# Patient Record
Sex: MALE | Race: WHITE | Employment: FULL TIME
[De-identification: names, ages, dates, MRNs, and addresses within clinical notes are randomized per-mention and may not be internally consistent; named-entity substitution may affect disease eponyms.]

---

## 2017-01-05 ENCOUNTER — TELEPHONE (OUTPATIENT)
Dept: FAMILY MEDICINE CLINIC | Facility: CLINIC | Age: 52
End: 2017-01-05

## 2017-01-05 DIAGNOSIS — M25.562 ACUTE PAIN OF LEFT KNEE: Primary | ICD-10-CM

## 2017-09-26 ENCOUNTER — OFFICE VISIT (OUTPATIENT)
Dept: FAMILY MEDICINE CLINIC | Age: 52
End: 2017-09-26
Payer: COMMERCIAL

## 2017-09-26 VITALS
DIASTOLIC BLOOD PRESSURE: 80 MMHG | TEMPERATURE: 97.2 F | OXYGEN SATURATION: 96 % | SYSTOLIC BLOOD PRESSURE: 116 MMHG | BODY MASS INDEX: 36.32 KG/M2 | WEIGHT: 283 LBS | HEART RATE: 71 BPM | HEIGHT: 74 IN

## 2017-09-26 DIAGNOSIS — Z12.5 SCREENING FOR PROSTATE CANCER: ICD-10-CM

## 2017-09-26 DIAGNOSIS — Z12.11 SCREENING FOR COLORECTAL CANCER: ICD-10-CM

## 2017-09-26 DIAGNOSIS — Z13.220 SCREENING, LIPID: ICD-10-CM

## 2017-09-26 DIAGNOSIS — Z12.12 SCREENING FOR COLORECTAL CANCER: ICD-10-CM

## 2017-09-26 DIAGNOSIS — M17.12 PRIMARY OSTEOARTHRITIS OF LEFT KNEE: Primary | ICD-10-CM

## 2017-09-26 PROCEDURE — 99213 OFFICE O/P EST LOW 20 MIN: CPT | Performed by: PHYSICIAN ASSISTANT

## 2017-09-26 RX ORDER — MELOXICAM 15 MG/1
15 TABLET ORAL DAILY
Qty: 30 TABLET | Refills: 3 | Status: SHIPPED | OUTPATIENT
Start: 2017-09-26 | End: 2018-03-23 | Stop reason: ALTCHOICE

## 2017-09-26 ASSESSMENT — ENCOUNTER SYMPTOMS
COUGH: 0
COLOR CHANGE: 0
SHORTNESS OF BREATH: 0
BACK PAIN: 0

## 2018-01-05 RX ORDER — LORATADINE 10 MG/1
10 CAPSULE, LIQUID FILLED ORAL DAILY
Qty: 30 CAPSULE | Refills: 6 | Status: SHIPPED | OUTPATIENT
Start: 2018-01-05 | End: 2018-03-23 | Stop reason: SDUPTHER

## 2018-01-05 RX ORDER — OMEPRAZOLE 20 MG/1
20 CAPSULE, DELAYED RELEASE ORAL DAILY
Qty: 90 CAPSULE | Refills: 3 | Status: SHIPPED | OUTPATIENT
Start: 2018-01-05 | End: 2018-03-23 | Stop reason: ALTCHOICE

## 2018-01-05 NOTE — TELEPHONE ENCOUNTER
Next Visit Date:  No future appointments.     Health Maintenance   Topic Date Due    HIV screen  07/05/1982    DTaP/Tdap/Td vaccine (1 - Tdap) 07/05/1986    Lipid screen  07/05/2007    Diabetes screen  07/05/2007    Colon cancer screen colonoscopy  07/05/2017    Flu vaccine  Completed       No results found for: LABA1C          ( goal A1C is < 7)   No results found for: LABMICR  No results found for: LDLCHOLESTEROL, LDLCALC    (goal LDL is <100)   No results found for: AST, ALT, BUN  BP Readings from Last 3 Encounters:   09/26/17 116/80   11/21/16 122/80   05/28/16 (!) 138/91          (goal 120/80)    All Future Testing planned in [facility]  Lab Frequency Next Occurrence   Comprehensive Metabolic Panel Once 97/05/5463   Lipid Panel Once 08/03/2018   CBC Auto Differential Once 08/03/2018   Psa screening Once 08/03/2018               Patient Active Problem List:     GERD (gastroesophageal reflux disease)     Allergic rhinitis

## 2018-03-23 ENCOUNTER — OFFICE VISIT (OUTPATIENT)
Dept: FAMILY MEDICINE CLINIC | Age: 53
End: 2018-03-23
Payer: COMMERCIAL

## 2018-03-23 VITALS
OXYGEN SATURATION: 98 % | BODY MASS INDEX: 35.78 KG/M2 | WEIGHT: 293.8 LBS | TEMPERATURE: 97 F | DIASTOLIC BLOOD PRESSURE: 68 MMHG | HEART RATE: 95 BPM | HEIGHT: 76 IN | SYSTOLIC BLOOD PRESSURE: 112 MMHG

## 2018-03-23 DIAGNOSIS — Z82.49 FAMILY HISTORY OF HEART DISEASE: ICD-10-CM

## 2018-03-23 DIAGNOSIS — I21.4 NSTEMI (NON-ST ELEVATED MYOCARDIAL INFARCTION) (HCC): Primary | ICD-10-CM

## 2018-03-23 PROCEDURE — 1111F DSCHRG MED/CURRENT MED MERGE: CPT | Performed by: PHYSICIAN ASSISTANT

## 2018-03-23 PROCEDURE — 99213 OFFICE O/P EST LOW 20 MIN: CPT | Performed by: PHYSICIAN ASSISTANT

## 2018-03-23 RX ORDER — PANTOPRAZOLE SODIUM 40 MG/1
40 TABLET, DELAYED RELEASE ORAL DAILY
Qty: 30 TABLET | Refills: 5 | Status: SHIPPED | OUTPATIENT
Start: 2018-03-23 | End: 2019-08-26 | Stop reason: SDUPTHER

## 2018-03-23 RX ORDER — CLOPIDOGREL BISULFATE 75 MG/1
75 TABLET ORAL DAILY
COMMUNITY
Start: 2018-03-16 | End: 2018-03-23 | Stop reason: SDUPTHER

## 2018-03-23 RX ORDER — ASPIRIN 81 MG/1
81 TABLET, DELAYED RELEASE ORAL DAILY
Refills: 3 | COMMUNITY
Start: 2018-03-15 | End: 2019-04-29 | Stop reason: SDUPTHER

## 2018-03-23 RX ORDER — METOPROLOL SUCCINATE 100 MG/1
100 TABLET, EXTENDED RELEASE ORAL DAILY
Qty: 30 TABLET | Refills: 5 | Status: SHIPPED | OUTPATIENT
Start: 2018-03-23 | End: 2019-08-26

## 2018-03-23 RX ORDER — PANTOPRAZOLE SODIUM 40 MG/1
40 TABLET, DELAYED RELEASE ORAL DAILY
COMMUNITY
End: 2018-03-23 | Stop reason: SDUPTHER

## 2018-03-23 RX ORDER — ATORVASTATIN CALCIUM 80 MG/1
80 TABLET, FILM COATED ORAL DAILY
COMMUNITY
Start: 2018-03-15 | End: 2018-03-23 | Stop reason: SDUPTHER

## 2018-03-23 RX ORDER — NITROGLYCERIN 0.4 MG/1
0.4 TABLET SUBLINGUAL EVERY 5 MIN PRN
Qty: 25 TABLET | Refills: 0 | Status: SHIPPED | OUTPATIENT
Start: 2018-03-23

## 2018-03-23 RX ORDER — LORATADINE 10 MG/1
10 CAPSULE, LIQUID FILLED ORAL DAILY
Qty: 30 CAPSULE | Refills: 6 | Status: SHIPPED | OUTPATIENT
Start: 2018-03-23 | End: 2019-08-26 | Stop reason: SDUPTHER

## 2018-03-23 RX ORDER — ATORVASTATIN CALCIUM 80 MG/1
80 TABLET, FILM COATED ORAL DAILY
Qty: 30 TABLET | Refills: 5 | Status: SHIPPED | OUTPATIENT
Start: 2018-03-23 | End: 2019-08-26 | Stop reason: SDUPTHER

## 2018-03-23 RX ORDER — CLOPIDOGREL BISULFATE 75 MG/1
75 TABLET ORAL DAILY
Qty: 30 TABLET | Refills: 5 | Status: SHIPPED | OUTPATIENT
Start: 2018-03-23 | End: 2019-08-26 | Stop reason: SDUPTHER

## 2018-03-23 RX ORDER — NITROGLYCERIN 0.4 MG/1
0.4 TABLET SUBLINGUAL EVERY 5 MIN PRN
COMMUNITY
End: 2018-03-23 | Stop reason: SDUPTHER

## 2018-03-23 RX ORDER — METOPROLOL SUCCINATE 100 MG/1
100 TABLET, EXTENDED RELEASE ORAL DAILY
COMMUNITY
Start: 2018-03-15 | End: 2018-03-23 | Stop reason: SDUPTHER

## 2018-03-23 ASSESSMENT — ENCOUNTER SYMPTOMS
CONSTIPATION: 0
SHORTNESS OF BREATH: 0
COLOR CHANGE: 0
COUGH: 0
WHEEZING: 0
ABDOMINAL PAIN: 0
DIARRHEA: 0
NAUSEA: 0
VOMITING: 0

## 2018-03-23 ASSESSMENT — PATIENT HEALTH QUESTIONNAIRE - PHQ9
1. LITTLE INTEREST OR PLEASURE IN DOING THINGS: 0
SUM OF ALL RESPONSES TO PHQ QUESTIONS 1-9: 0
2. FEELING DOWN, DEPRESSED OR HOPELESS: 0
SUM OF ALL RESPONSES TO PHQ9 QUESTIONS 1 & 2: 0

## 2018-03-23 NOTE — LETTER
[address]  [facility] [address]  Phone: [phone]  Fax: [phone]    Ceasardelfino Prosper        March 23, 2018     Patient: Tiffanie Carpio   YOB: 1965   Date of Visit: 3/23/2018       To Whom It May Concern:    Mr Tiffanie Carpio was hospitalized from 3/12/18. Due to his condition, he should not work until June 1st 2018 unless otherwise stated by his outpatient cardiologist. Please release him from work during this period. It would be in the best interest of this patient and his health        Sincerely,        Bibi Polk PA-C

## 2018-03-23 NOTE — PROGRESS NOTES
[address] [facility] [address]  Dept: [phone]  Dept Fax: [phone]    Bowen Novoa is a 50 y.o. male who presents today for his medical conditions/complaints as noted below. Bowen Novoa is c/o of   Chief Complaint   Patient presents with    Other     Patient is here for follow up after having chest pain at work. Patient was in [facility].         HPI:     HPI    Patient states he was at work and started having chest pain. He went to [facility] and had a work up there. Initial work up was neg but while he was there elevation in the enzymes were noted. He had angiogram done and during the testing he had cardiac arrest.   He ended up having 3 stents placed through right arm cath. He has follow up with Dr. Martinez Goncalves at [facility] for cardiology next week. He is also scheduled for cardiac rehab  Off work through June 4th per cardiology. They need another note stating the same due to insurance request    He is feeling well at this point. Here today with his wife. He states eating and drinking well.     No results found for: LABA1C          ( goal A1C is < 7)   No results found for: LABMICR  No results found for: LDLCHOLESTEROL, LDLCALC    (goal LDL is <100)   No results found for: AST, ALT, BUN  BP Readings from Last 3 Encounters:   03/23/18 112/68   09/26/17 116/80   11/21/16 122/80          (goal 120/80)    Past Medical History:   Diagnosis Date    Allergic rhinitis     GERD (gastroesophageal reflux disease)     Heart attack 03/12/2018    Patient had 3 stents placed    NSTEMI (non-ST elevated myocardial infarction) ([address] 3/23/2018      Past Surgical History:   Procedure Laterality Date    CORONARY ANGIOPLASTY WITH STENT PLACEMENT  03/12/2018    3 stents xience alpine 4.0 x 18 mm LAD    KNEE SURGERY Left     at 26 yo       Family History   Problem Relation Age of Onset    Diabetes Mother     Diabetes pain, constipation, diarrhea, nausea and vomiting. Genitourinary: Negative for dysuria. Skin: Negative for color change, pallor, rash and wound. Neurological: Negative for weakness. Hematological: Negative for adenopathy. Psychiatric/Behavioral: The patient is not nervous/anxious. Objective:     Physical Exam   Constitutional: He is oriented to person, place, and time. He appears well-developed and well-nourished. HENT:   Head: Normocephalic and atraumatic. Cardiovascular: Normal rate, regular rhythm and normal heart sounds. No murmur heard. Pulmonary/Chest: Effort normal and breath sounds normal. No respiratory distress. He has no wheezes. He has no rales. Musculoskeletal: He exhibits no edema. Neurological: He is alert and oriented to person, place, and time. Skin: Skin is warm and dry. No rash noted. No erythema. No pallor. Psychiatric: He has a normal mood and affect. His behavior is normal. Judgment and thought content normal.   Nursing note and vitals reviewed. /68   Pulse 95   Temp 97 °F (36.1 °C) (Oral)   Ht 6' 4\" (1.93 m)   Wt 293 lb 12.8 oz (133.3 kg)   SpO2 98%   BMI 35.76 kg/m²     Assessment:      1. NSTEMI (non-ST elevated myocardial infarction) ([address]     2. Family history of heart disease               Plan:      Return in about 3 months (around 6/23/2018) for med review. Medications reviewed, continue with no changes  Cont with cardiology follow up upcoming  We did provide another note for patient with same dates as cardiology note, copy to system  Encouraged healthy LF diet, exercise as recommended per cardiology  Avoid salt in diet  Will see patient back in 3 mo sooner prn  Patient agreeed with treatment plan  Health Maintenance reviewed.       Orders Placed This Encounter   Medications    loratadine (CLARITIN) 10 MG capsule     Sig: Take 1 capsule by mouth daily     Dispense:  30 capsule     Refill:  6    pantoprazole (PROTONIX) 40 MG tablet

## 2018-03-23 NOTE — LETTER
[address]  [place] [address]  Phone: [phone]  Fax: [phone]    Ashlydaryl Shirley        March 23, 2018     Patient: Medardo Rubin   YOB: 1965   Date of Visit: 3/23/2018       To Whom It May Concern:    Mr Medardo Rubin was hospitalized from 3/12/18 under my care for a serious heart condition. Due to his condition, he should not work until June 1st 2018 unless otherwise stated by his outpatient cardiologist. Please, release him from work during this period. It would be in the best interest of this patient and his health.         Sincerely,        Maurice Morillo PA-C

## 2018-04-02 ENCOUNTER — OFFICE VISIT (OUTPATIENT)
Dept: FAMILY MEDICINE CLINIC | Age: 53
End: 2018-04-02
Payer: COMMERCIAL

## 2018-04-02 VITALS
HEIGHT: 76 IN | WEIGHT: 288 LBS | OXYGEN SATURATION: 97 % | DIASTOLIC BLOOD PRESSURE: 60 MMHG | HEART RATE: 81 BPM | TEMPERATURE: 97 F | SYSTOLIC BLOOD PRESSURE: 116 MMHG | RESPIRATION RATE: 18 BRPM | BODY MASS INDEX: 35.07 KG/M2

## 2018-04-02 DIAGNOSIS — K59.09 OTHER CONSTIPATION: ICD-10-CM

## 2018-04-02 DIAGNOSIS — K64.4 HEMORRHOIDS, EXTERNAL: Primary | ICD-10-CM

## 2018-04-02 DIAGNOSIS — K60.2 FISSURE IN ANO: ICD-10-CM

## 2018-04-02 PROCEDURE — 99213 OFFICE O/P EST LOW 20 MIN: CPT | Performed by: INTERNAL MEDICINE

## 2018-04-02 ASSESSMENT — ENCOUNTER SYMPTOMS
CHEST TIGHTNESS: 0
ABDOMINAL PAIN: 0
BACK PAIN: 0
RECTAL PAIN: 1
DIARRHEA: 0
ABDOMINAL DISTENTION: 0
STRIDOR: 0
FLATUS: 1
CHOKING: 0
BLOOD IN STOOL: 0
VOMITING: 0
SHORTNESS OF BREATH: 0
WHEEZING: 0
COUGH: 0
CONSTIPATION: 1
ANAL BLEEDING: 1
NAUSEA: 0

## 2018-05-24 ENCOUNTER — OFFICE VISIT (OUTPATIENT)
Dept: FAMILY MEDICINE CLINIC | Age: 53
End: 2018-05-24
Payer: COMMERCIAL

## 2018-05-24 VITALS
OXYGEN SATURATION: 99 % | SYSTOLIC BLOOD PRESSURE: 112 MMHG | HEIGHT: 76 IN | HEART RATE: 83 BPM | WEIGHT: 285 LBS | DIASTOLIC BLOOD PRESSURE: 68 MMHG | BODY MASS INDEX: 34.7 KG/M2

## 2018-05-24 DIAGNOSIS — Z12.11 COLON CANCER SCREENING: ICD-10-CM

## 2018-05-24 DIAGNOSIS — I21.4 NSTEMI (NON-ST ELEVATED MYOCARDIAL INFARCTION) (HCC): Primary | ICD-10-CM

## 2018-05-24 PROCEDURE — 99213 OFFICE O/P EST LOW 20 MIN: CPT | Performed by: PHYSICIAN ASSISTANT

## 2018-05-24 ASSESSMENT — ENCOUNTER SYMPTOMS
SHORTNESS OF BREATH: 0
CONSTIPATION: 0
NAUSEA: 0
WHEEZING: 0
ABDOMINAL PAIN: 0
VOMITING: 0
COLOR CHANGE: 0
COUGH: 0
DIARRHEA: 0

## 2018-09-05 ENCOUNTER — OFFICE VISIT (OUTPATIENT)
Dept: FAMILY MEDICINE CLINIC | Age: 53
End: 2018-09-05
Payer: COMMERCIAL

## 2018-09-05 VITALS
HEIGHT: 76 IN | WEIGHT: 289.6 LBS | SYSTOLIC BLOOD PRESSURE: 120 MMHG | OXYGEN SATURATION: 97 % | HEART RATE: 74 BPM | BODY MASS INDEX: 35.27 KG/M2 | DIASTOLIC BLOOD PRESSURE: 76 MMHG

## 2018-09-05 DIAGNOSIS — M79.672 LEFT FOOT PAIN: Primary | ICD-10-CM

## 2018-09-05 DIAGNOSIS — R06.83 SNORING: ICD-10-CM

## 2018-09-05 PROCEDURE — 99214 OFFICE O/P EST MOD 30 MIN: CPT | Performed by: PHYSICIAN ASSISTANT

## 2018-09-05 ASSESSMENT — ENCOUNTER SYMPTOMS
SHORTNESS OF BREATH: 0
COUGH: 0
WHEEZING: 0
COLOR CHANGE: 0

## 2018-09-05 NOTE — PROGRESS NOTES
Diabetes Father     Heart Disease Brother         also smoker    Heart Attack Paternal Uncle         11 of the brothers with heart disease    Heart Disease Paternal Uncle        Social History   Substance Use Topics    Smoking status: Never Smoker    Smokeless tobacco: Never Used    Alcohol use No      Current Outpatient Prescriptions   Medication Sig Dispense Refill    SM ASPIRIN ADULT LOW STRENGTH 81 MG EC tablet Take 81 mg by mouth daily  3    loratadine (CLARITIN) 10 MG capsule Take 1 capsule by mouth daily 30 capsule 6    pantoprazole (PROTONIX) 40 MG tablet Take 1 tablet by mouth daily 30 tablet 5    nitroGLYCERIN (NITROSTAT) 0.4 MG SL tablet Place 1 tablet under the tongue every 5 minutes as needed for Chest pain up to max of 3 total doses. If no relief after 1 dose, call 911. 25 tablet 0    metoprolol succinate (TOPROL XL) 100 MG extended release tablet Take 1 tablet by mouth daily 30 tablet 5    clopidogrel (PLAVIX) 75 MG tablet Take 1 tablet by mouth daily 30 tablet 5    atorvastatin (LIPITOR) 80 MG tablet Take 1 tablet by mouth daily 30 tablet 5     No current facility-administered medications for this visit. No Known Allergies    Health Maintenance   Topic Date Due    HIV screen  07/05/1982    DTaP/Tdap/Td vaccine (1 - Tdap) 07/05/1986    Lipid screen  07/05/2007    Diabetes screen  07/05/2007    Shingles Vaccine (1 of 2 - 2 Dose Series) 07/05/2017    Colon cancer screen colonoscopy  07/05/2017    Flu vaccine (1) 09/01/2018       Subjective:      Review of Systems   Constitutional: Negative for activity change, appetite change, fatigue, fever and unexpected weight change. /76   Pulse 74   Ht 6' 4\" (1.93 m)   Wt 289 lb 9.6 oz (131.4 kg)   SpO2 97%   BMI 35.25 kg/m²    Respiratory: Negative for cough, shortness of breath and wheezing. Cardiovascular: Negative for chest pain and palpitations. Musculoskeletal: Positive for arthralgias.  Negative for joint Acid     Standing Status:   Future     Standing Expiration Date:   9/5/2019    Sedimentation Rate     Standing Status:   Future     Standing Expiration Date:   9/5/2019    External Referral To Pulmonology     Referral Priority:   Routine     Referral Type:   Consult for Advice and Opinion     Referral Reason:   Specialty Services Required     Referred to Provider:   Jaylyn Ma Specialty:   Pulmonology     Number of Visits Requested:   1         Patient given educational materials - see patient instructions. Discussed use, benefit, and side effects of prescribed medications. All patient questions answered. Pt voiced understanding. Reviewed health maintenance. Instructed to continue current medications, diet and exercise. Patient agreed with treatment plan. Follow up as directed.      Electronically signed by Ulysess Duane, PA-C on 9/5/2018 at 10:01 AM

## 2018-09-25 ENCOUNTER — TELEPHONE (OUTPATIENT)
Dept: FAMILY MEDICINE CLINIC | Age: 53
End: 2018-09-25

## 2018-09-25 DIAGNOSIS — R06.83 SNORING: Primary | ICD-10-CM

## 2018-09-26 DIAGNOSIS — M79.672 LEFT FOOT PAIN: ICD-10-CM

## 2018-09-28 ENCOUNTER — NURSE ONLY (OUTPATIENT)
Dept: FAMILY MEDICINE CLINIC | Age: 53
End: 2018-09-28
Payer: COMMERCIAL

## 2018-09-28 DIAGNOSIS — Z23 NEED FOR IMMUNIZATION AGAINST INFLUENZA: Primary | ICD-10-CM

## 2018-09-28 PROCEDURE — 90471 IMMUNIZATION ADMIN: CPT | Performed by: PHYSICIAN ASSISTANT

## 2018-09-28 PROCEDURE — 90686 IIV4 VACC NO PRSV 0.5 ML IM: CPT | Performed by: PHYSICIAN ASSISTANT

## 2018-11-06 ENCOUNTER — OFFICE VISIT (OUTPATIENT)
Dept: FAMILY MEDICINE CLINIC | Age: 53
End: 2018-11-06
Payer: COMMERCIAL

## 2018-11-06 VITALS
OXYGEN SATURATION: 98 % | WEIGHT: 297 LBS | SYSTOLIC BLOOD PRESSURE: 120 MMHG | DIASTOLIC BLOOD PRESSURE: 64 MMHG | HEIGHT: 76 IN | HEART RATE: 79 BPM | BODY MASS INDEX: 36.17 KG/M2

## 2018-11-06 DIAGNOSIS — I25.10 CORONARY ARTERY DISEASE INVOLVING NATIVE HEART WITHOUT ANGINA PECTORIS, UNSPECIFIED VESSEL OR LESION TYPE: Primary | ICD-10-CM

## 2018-11-06 DIAGNOSIS — M77.01 MEDIAL EPICONDYLITIS OF RIGHT ELBOW: ICD-10-CM

## 2018-11-06 DIAGNOSIS — Z12.11 COLON CANCER SCREENING: ICD-10-CM

## 2018-11-06 DIAGNOSIS — Z12.5 PROSTATE CANCER SCREENING: ICD-10-CM

## 2018-11-06 PROCEDURE — 99213 OFFICE O/P EST LOW 20 MIN: CPT | Performed by: PHYSICIAN ASSISTANT

## 2018-11-06 ASSESSMENT — ENCOUNTER SYMPTOMS
DIARRHEA: 0
WHEEZING: 0
CONSTIPATION: 0
VOMITING: 0
SHORTNESS OF BREATH: 0
ABDOMINAL PAIN: 0
COLOR CHANGE: 0
NAUSEA: 0
COUGH: 0

## 2018-11-06 ASSESSMENT — PATIENT HEALTH QUESTIONNAIRE - PHQ9
1. LITTLE INTEREST OR PLEASURE IN DOING THINGS: 0
SUM OF ALL RESPONSES TO PHQ QUESTIONS 1-9: 0
2. FEELING DOWN, DEPRESSED OR HOPELESS: 0
SUM OF ALL RESPONSES TO PHQ9 QUESTIONS 1 & 2: 0
SUM OF ALL RESPONSES TO PHQ QUESTIONS 1-9: 0

## 2018-11-06 NOTE — PROGRESS NOTES
Visit Information    Have you changed or started any medications since your last visit including any over-the-counter medicines, vitamins, or herbal medicines? no   Have you stopped taking any of your medications? Is so, why? -  no  Are you having any side effects from any of your medications? - no    Have you seen any other physician or provider since your last visit?  no   Have you had any other diagnostic tests since your last visit?  no   Have you been seen in the emergency room and/or had an admission in a hospital since we last saw you?  no   Have you had your routine dental cleaning in the past 6 months?  no     Do you have an active MyChart account? If no, what is the barrier?   Yes    Patient Care Team:  Jovan Meredith PA-C as PCP - General (Family Medicine)    Medical History Review  Past Medical, Family, and Social History reviewed and does contribute to the patient presenting condition    Health Maintenance   Topic Date Due    HIV screen  07/05/1982    DTaP/Tdap/Td vaccine (1 - Tdap) 07/05/1986    Lipid screen  07/05/2007    Diabetes screen  07/05/2007    Shingles Vaccine (1 of 2 - 2 Dose Series) 07/05/2017    Colon cancer screen colonoscopy  07/05/2017    Flu vaccine  Completed
Amb External Referral To Gastroenterology             Plan:      Return in about 6 months (around 5/6/2019) for med review. Cont with cardiologist as planned  Update labs and recommended he request lab send copy to his cardiologist as well  Screening prostate as well  New referral for colonoscopy, pt did not get done last year. Pt agreed to call to schedule soon  Epicondylitis present. Avoid nsaids due to heart hx and plavix. Written rx for elbow brace given, prn ice and avoid repetitive motion  Patient agreed with treatment plan      Orders Placed This Encounter   Procedures    CBC Auto Differential     Standing Status:   Future     Standing Expiration Date:   11/6/2019    Comprehensive Metabolic Panel     Standing Status:   Future     Standing Expiration Date:   11/6/2019    Lipid Panel     Standing Status:   Future     Standing Expiration Date:   11/6/2019     Order Specific Question:   Is Patient Fasting?/# of Hours     Answer:   10-12    Psa screening     Standing Status:   Future     Standing Expiration Date:   11/6/2019    Amb External Referral To Gastroenterology     Referral Priority:   Routine     Referral Type:   Consult for Advice and Opinion     Referral Reason:   Specialty Services Required     Referred to Provider:   Patricia Reeves     Requested Specialty:   Gastroenterology     Number of Visits Requested:   1     No orders of the defined types were placed in this encounter. Patient given educational materials - see patient instructions. Discussed use, benefit, and side effects of prescribed medications. All patientquestions answered. Pt voiced understanding. Reviewed health maintenance. Instructedto continue current medications, diet and exercise. Patient agreed with treatmentplan. Follow up as directed.      Electronically signed by Greg Charlton PA-C on 11/6/2018 at 9:27 AM

## 2018-11-12 LAB
ALBUMIN SERPL-MCNC: 4 G/DL
ALP BLD-CCNC: 53 U/L
ALT SERPL-CCNC: 26 U/L
ANION GAP SERPL CALCULATED.3IONS-SCNC: 6 MMOL/L
AST SERPL-CCNC: 19 U/L
BASOPHILS ABSOLUTE: 0 /ΜL
BASOPHILS RELATIVE PERCENT: 0.4 %
BILIRUB SERPL-MCNC: 0.6 MG/DL (ref 0.1–1.4)
BUN BLDV-MCNC: 14 MG/DL
CALCIUM SERPL-MCNC: 9.3 MG/DL
CHLORIDE BLD-SCNC: 106 MMOL/L
CHOLESTEROL, TOTAL: 80 MG/DL
CHOLESTEROL/HDL RATIO: 3.6
CO2: 28 MMOL/L
CREAT SERPL-MCNC: 0.8 MG/DL
EOSINOPHILS ABSOLUTE: 0.2 /ΜL
EOSINOPHILS RELATIVE PERCENT: 2.3 %
GFR CALCULATED: >60
GLUCOSE BLD-MCNC: 117 MG/DL
HCT VFR BLD CALC: 42.4 % (ref 41–53)
HDLC SERPL-MCNC: 22 MG/DL (ref 35–70)
HEMOGLOBIN: 14.7 G/DL (ref 13.5–17.5)
LDL CHOLESTEROL CALCULATED: 35 MG/DL (ref 0–160)
LYMPHOCYTES ABSOLUTE: 2 /ΜL
LYMPHOCYTES RELATIVE PERCENT: 22.2 %
MCH RBC QN AUTO: 31 PG
MCHC RBC AUTO-ENTMCNC: 34.8 G/DL
MCV RBC AUTO: 89 FL
MONOCYTES ABSOLUTE: 0.8 /ΜL
MONOCYTES RELATIVE PERCENT: 8.2 %
NEUTROPHILS ABSOLUTE: 6.1 /ΜL
NEUTROPHILS RELATIVE PERCENT: 66.9 %
PDW BLD-RTO: 13.1 %
PLATELET # BLD: 150 K/ΜL
PMV BLD AUTO: 9.1 FL
POTASSIUM SERPL-SCNC: 4.3 MMOL/L
PROSTATE SPECIFIC ANTIGEN: 0.14 NG/ML
RBC # BLD: 4.76 10^6/ΜL
SODIUM BLD-SCNC: 140 MMOL/L
TOTAL PROTEIN: 7.3
TRIGL SERPL-MCNC: 117 MG/DL
VLDLC SERPL CALC-MCNC: 23 MG/DL
WBC # BLD: 9.1 10^3/ML

## 2018-11-13 DIAGNOSIS — Z12.5 PROSTATE CANCER SCREENING: ICD-10-CM

## 2018-11-13 DIAGNOSIS — I25.10 CORONARY ARTERY DISEASE INVOLVING NATIVE HEART WITHOUT ANGINA PECTORIS, UNSPECIFIED VESSEL OR LESION TYPE: ICD-10-CM

## 2019-01-23 LAB
AVERAGE GLUCOSE: 105
BUN BLDV-MCNC: 16 MG/DL
CALCIUM SERPL-MCNC: 9.5 MG/DL
CHLORIDE BLD-SCNC: 104 MMOL/L
CHOLESTEROL, TOTAL: 129 MG/DL
CHOLESTEROL/HDL RATIO: 5.4
CO2: 30 MMOL/L
CREAT SERPL-MCNC: 0.87 MG/DL
GFR CALCULATED: >60
GLUCOSE BLD-MCNC: 99 MG/DL
HBA1C MFR BLD: 5.3 %
HCT VFR BLD CALC: 44.5 % (ref 41–53)
HDLC SERPL-MCNC: 24 MG/DL (ref 35–70)
HEMOGLOBIN: 15.4 G/DL (ref 13.5–17.5)
LDL CHOLESTEROL CALCULATED: 36 MG/DL (ref 0–160)
PLATELET # BLD: 167 K/ΜL
POTASSIUM SERPL-SCNC: 4.2 MMOL/L
SODIUM BLD-SCNC: 140 MMOL/L
TRIGL SERPL-MCNC: 344 MG/DL
VLDLC SERPL CALC-MCNC: 69 MG/DL
WBC # BLD: 7.7 10^3/ML

## 2019-02-20 ENCOUNTER — OFFICE VISIT (OUTPATIENT)
Dept: FAMILY MEDICINE CLINIC | Age: 54
End: 2019-02-20
Payer: COMMERCIAL

## 2019-02-20 VITALS
WEIGHT: 293 LBS | HEIGHT: 76 IN | HEART RATE: 74 BPM | DIASTOLIC BLOOD PRESSURE: 80 MMHG | BODY MASS INDEX: 35.68 KG/M2 | OXYGEN SATURATION: 98 % | SYSTOLIC BLOOD PRESSURE: 120 MMHG

## 2019-02-20 DIAGNOSIS — I25.10 CORONARY ARTERY DISEASE INVOLVING NATIVE HEART WITHOUT ANGINA PECTORIS, UNSPECIFIED VESSEL OR LESION TYPE: Primary | ICD-10-CM

## 2019-02-20 DIAGNOSIS — K21.9 GASTROESOPHAGEAL REFLUX DISEASE WITHOUT ESOPHAGITIS: ICD-10-CM

## 2019-02-20 DIAGNOSIS — Z23 NEED FOR SHINGLES VACCINE: ICD-10-CM

## 2019-02-20 PROCEDURE — 99213 OFFICE O/P EST LOW 20 MIN: CPT | Performed by: PHYSICIAN ASSISTANT

## 2019-02-20 ASSESSMENT — ENCOUNTER SYMPTOMS
COUGH: 0
SHORTNESS OF BREATH: 0
NAUSEA: 0
CONSTIPATION: 0
COLOR CHANGE: 0
WHEEZING: 0
ABDOMINAL PAIN: 0
DIARRHEA: 0
VOMITING: 0

## 2019-02-20 ASSESSMENT — PATIENT HEALTH QUESTIONNAIRE - PHQ9
SUM OF ALL RESPONSES TO PHQ9 QUESTIONS 1 & 2: 0
1. LITTLE INTEREST OR PLEASURE IN DOING THINGS: 0
SUM OF ALL RESPONSES TO PHQ QUESTIONS 1-9: 0
2. FEELING DOWN, DEPRESSED OR HOPELESS: 0
SUM OF ALL RESPONSES TO PHQ QUESTIONS 1-9: 0

## 2019-04-29 RX ORDER — ASPIRIN 81 MG/1
81 TABLET, DELAYED RELEASE ORAL DAILY
Qty: 90 TABLET | Refills: 3 | Status: SHIPPED | OUTPATIENT
Start: 2019-04-29 | End: 2020-07-01

## 2019-08-26 ENCOUNTER — OFFICE VISIT (OUTPATIENT)
Dept: FAMILY MEDICINE CLINIC | Age: 54
End: 2019-08-26
Payer: COMMERCIAL

## 2019-08-26 ENCOUNTER — HOSPITAL ENCOUNTER (OUTPATIENT)
Age: 54
Setting detail: SPECIMEN
Discharge: HOME | End: 2019-08-26
Payer: COMMERCIAL

## 2019-08-26 VITALS
WEIGHT: 269 LBS | HEIGHT: 76 IN | BODY MASS INDEX: 32.76 KG/M2 | SYSTOLIC BLOOD PRESSURE: 122 MMHG | HEART RATE: 90 BPM | DIASTOLIC BLOOD PRESSURE: 70 MMHG | TEMPERATURE: 97.5 F | OXYGEN SATURATION: 98 %

## 2019-08-26 DIAGNOSIS — R19.7 DIARRHEA, UNSPECIFIED TYPE: Primary | ICD-10-CM

## 2019-08-26 DIAGNOSIS — R31.9 URINARY TRACT INFECTION WITH HEMATURIA, SITE UNSPECIFIED: ICD-10-CM

## 2019-08-26 DIAGNOSIS — R19.7 DIARRHEA, UNSPECIFIED TYPE: ICD-10-CM

## 2019-08-26 DIAGNOSIS — R31.29 HEMATURIA, MICROSCOPIC: ICD-10-CM

## 2019-08-26 DIAGNOSIS — R11.2 NON-INTRACTABLE VOMITING WITH NAUSEA, UNSPECIFIED VOMITING TYPE: ICD-10-CM

## 2019-08-26 DIAGNOSIS — N39.0 URINARY TRACT INFECTION WITH HEMATURIA, SITE UNSPECIFIED: ICD-10-CM

## 2019-08-26 LAB
ABSOLUTE EOS #: 0.11 K/UL (ref 0–0.44)
ABSOLUTE IMMATURE GRANULOCYTE: <0.03 K/UL (ref 0–0.3)
ABSOLUTE LYMPH #: 1.91 K/UL (ref 1.1–3.7)
ABSOLUTE MONO #: 0.81 K/UL (ref 0.1–1.2)
ALBUMIN SERPL-MCNC: 4.3 G/DL (ref 3.5–5.2)
ALBUMIN/GLOBULIN RATIO: 1 (ref 1–2.5)
ALP BLD-CCNC: 71 U/L (ref 40–129)
ALT SERPL-CCNC: 38 U/L (ref 5–41)
ANION GAP SERPL CALCULATED.3IONS-SCNC: 17 MMOL/L (ref 9–17)
AST SERPL-CCNC: 25 U/L
BASOPHILS # BLD: 1 % (ref 0–2)
BASOPHILS ABSOLUTE: 0.05 K/UL (ref 0–0.2)
BILIRUB SERPL-MCNC: 0.7 MG/DL (ref 0.3–1.2)
BILIRUBIN, POC: ABNORMAL
BLOOD URINE, POC: ABNORMAL
BUN BLDV-MCNC: 18 MG/DL (ref 6–20)
BUN/CREAT BLD: ABNORMAL (ref 9–20)
CALCIUM SERPL-MCNC: 10.1 MG/DL (ref 8.6–10.4)
CHLORIDE BLD-SCNC: 103 MMOL/L (ref 98–107)
CLARITY, POC: ABNORMAL
CO2: 20 MMOL/L (ref 20–31)
COLOR, POC: ABNORMAL
CREAT SERPL-MCNC: 0.87 MG/DL (ref 0.7–1.2)
DIFFERENTIAL TYPE: ABNORMAL
EOSINOPHILS RELATIVE PERCENT: 1 % (ref 1–4)
GFR AFRICAN AMERICAN: >60 ML/MIN
GFR NON-AFRICAN AMERICAN: >60 ML/MIN
GFR SERPL CREATININE-BSD FRML MDRD: ABNORMAL ML/MIN/{1.73_M2}
GFR SERPL CREATININE-BSD FRML MDRD: ABNORMAL ML/MIN/{1.73_M2}
GLUCOSE BLD-MCNC: 109 MG/DL (ref 70–99)
GLUCOSE URINE, POC: ABNORMAL
HCT VFR BLD CALC: 49.5 % (ref 40.7–50.3)
HEMOGLOBIN: 16.3 G/DL (ref 13–17)
IMMATURE GRANULOCYTES: 0 %
KETONES, POC: ABNORMAL
LEUKOCYTE EST, POC: ABNORMAL
LYMPHOCYTES # BLD: 23 % (ref 24–43)
MCH RBC QN AUTO: 30.2 PG (ref 25.2–33.5)
MCHC RBC AUTO-ENTMCNC: 32.9 G/DL (ref 28.4–34.8)
MCV RBC AUTO: 91.8 FL (ref 82.6–102.9)
MONOCYTES # BLD: 10 % (ref 3–12)
NITRITE, POC: ABNORMAL
NRBC AUTOMATED: 0 PER 100 WBC
PDW BLD-RTO: 12.9 % (ref 11.8–14.4)
PH, POC: 5.5
PLATELET # BLD: 205 K/UL (ref 138–453)
PLATELET ESTIMATE: ABNORMAL
PMV BLD AUTO: 10.5 FL (ref 8.1–13.5)
POTASSIUM SERPL-SCNC: 4.4 MMOL/L (ref 3.7–5.3)
PROTEIN, POC: ABNORMAL
RBC # BLD: 5.39 M/UL (ref 4.21–5.77)
RBC # BLD: ABNORMAL 10*6/UL
SEG NEUTROPHILS: 65 % (ref 36–65)
SEGMENTED NEUTROPHILS ABSOLUTE COUNT: 5.35 K/UL (ref 1.5–8.1)
SODIUM BLD-SCNC: 140 MMOL/L (ref 135–144)
SPECIFIC GRAVITY, POC: 1.02
TOTAL PROTEIN: 8.4 G/DL (ref 6.4–8.3)
UROBILINOGEN, POC: 0.2
WBC # BLD: 8.3 K/UL (ref 3.5–11.3)
WBC # BLD: ABNORMAL 10*3/UL

## 2019-08-26 PROCEDURE — 99213 OFFICE O/P EST LOW 20 MIN: CPT | Performed by: PHYSICIAN ASSISTANT

## 2019-08-26 PROCEDURE — 81002 URINALYSIS NONAUTO W/O SCOPE: CPT | Performed by: PHYSICIAN ASSISTANT

## 2019-08-26 RX ORDER — LORATADINE 10 MG/1
10 CAPSULE, LIQUID FILLED ORAL DAILY
Qty: 90 CAPSULE | Refills: 3 | Status: SHIPPED | OUTPATIENT
Start: 2019-08-26 | End: 2021-09-20 | Stop reason: SDUPTHER

## 2019-08-26 RX ORDER — CLOPIDOGREL BISULFATE 75 MG/1
75 TABLET ORAL DAILY
Qty: 90 TABLET | Refills: 3 | Status: SHIPPED | OUTPATIENT
Start: 2019-08-26 | End: 2020-11-05 | Stop reason: SDUPTHER

## 2019-08-26 RX ORDER — ATORVASTATIN CALCIUM 80 MG/1
80 TABLET, FILM COATED ORAL DAILY
Qty: 90 TABLET | Refills: 3 | Status: SHIPPED | OUTPATIENT
Start: 2019-08-26 | End: 2020-12-12 | Stop reason: SDUPTHER

## 2019-08-26 RX ORDER — ONDANSETRON 4 MG/1
4 TABLET, ORALLY DISINTEGRATING ORAL EVERY 8 HOURS PRN
Qty: 30 TABLET | Refills: 0 | Status: SHIPPED | OUTPATIENT
Start: 2019-08-26 | End: 2020-02-24 | Stop reason: ALTCHOICE

## 2019-08-26 RX ORDER — METOPROLOL SUCCINATE 50 MG/1
50 TABLET, EXTENDED RELEASE ORAL DAILY
COMMUNITY
End: 2020-08-30 | Stop reason: SDUPTHER

## 2019-08-26 RX ORDER — PANTOPRAZOLE SODIUM 40 MG/1
40 TABLET, DELAYED RELEASE ORAL DAILY
Qty: 90 TABLET | Refills: 3 | Status: SHIPPED | OUTPATIENT
Start: 2019-08-26 | End: 2020-08-29 | Stop reason: SDUPTHER

## 2019-08-26 ASSESSMENT — ENCOUNTER SYMPTOMS
CONSTIPATION: 0
NAUSEA: 0
COUGH: 0
BLOOD IN STOOL: 0
SHORTNESS OF BREATH: 0
FLATUS: 0
COLOR CHANGE: 0
DIARRHEA: 0
WHEEZING: 0
ABDOMINAL PAIN: 0
VOMITING: 0

## 2019-08-27 ENCOUNTER — HOSPITAL ENCOUNTER (OUTPATIENT)
Age: 54
Setting detail: SPECIMEN
Discharge: HOME | End: 2019-08-27
Payer: COMMERCIAL

## 2019-08-27 LAB
CASE NUMBER:: NORMAL
DIRECT EXAM: NORMAL
Lab: NORMAL
Lab: NORMAL
MICRO OVA & PARASITES: NORMAL
SPECIMEN DESCRIPTION: NORMAL
SURGICAL PATHOLOGY REPORT: NORMAL

## 2019-08-28 LAB
C DIFF AG + TOXIN: NEGATIVE
CAMPYLOBACTER PCR: NORMAL
DIRECT EXAM: NORMAL
DIRECT EXAM: NORMAL
E COLI ENTEROTOXIGENIC PCR: NORMAL
Lab: NORMAL
PLESIOMONAS SHIGELLOIDES PCR: NORMAL
SALMONELLA PCR: NORMAL
SHIGATOXIN GENE PCR: NORMAL
SHIGELLA SP PCR: NORMAL
SPECIMEN DESCRIPTION: NORMAL
VIBRIO PCR: NORMAL
YERSINIA ENTEROCOLITICA PCR: NORMAL

## 2019-10-01 ENCOUNTER — NURSE ONLY (OUTPATIENT)
Dept: FAMILY MEDICINE CLINIC | Age: 54
End: 2019-10-01
Payer: COMMERCIAL

## 2019-10-01 DIAGNOSIS — Z23 NEED FOR IMMUNIZATION AGAINST INFLUENZA: Primary | ICD-10-CM

## 2019-10-01 PROCEDURE — 90471 IMMUNIZATION ADMIN: CPT | Performed by: PHYSICIAN ASSISTANT

## 2019-10-01 PROCEDURE — 90686 IIV4 VACC NO PRSV 0.5 ML IM: CPT | Performed by: PHYSICIAN ASSISTANT

## 2020-02-24 ENCOUNTER — OFFICE VISIT (OUTPATIENT)
Dept: FAMILY MEDICINE CLINIC | Age: 55
End: 2020-02-24
Payer: COMMERCIAL

## 2020-02-24 ENCOUNTER — PATIENT MESSAGE (OUTPATIENT)
Dept: FAMILY MEDICINE CLINIC | Age: 55
End: 2020-02-24

## 2020-02-24 VITALS
OXYGEN SATURATION: 98 % | HEIGHT: 76 IN | DIASTOLIC BLOOD PRESSURE: 70 MMHG | WEIGHT: 269 LBS | BODY MASS INDEX: 32.76 KG/M2 | SYSTOLIC BLOOD PRESSURE: 112 MMHG | HEART RATE: 69 BPM

## 2020-02-24 PROBLEM — G47.33 OBSTRUCTIVE SLEEP APNEA SYNDROME: Status: ACTIVE | Noted: 2020-02-24

## 2020-02-24 PROBLEM — I21.4 NSTEMI (NON-ST ELEVATED MYOCARDIAL INFARCTION) (HCC): Status: ACTIVE | Noted: 2018-03-11

## 2020-02-24 PROBLEM — K21.9 GASTROESOPHAGEAL REFLUX DISEASE WITHOUT ESOPHAGITIS: Status: ACTIVE | Noted: 2018-03-12

## 2020-02-24 PROBLEM — R73.03 PREDIABETES: Status: ACTIVE | Noted: 2018-03-13

## 2020-02-24 PROCEDURE — 99213 OFFICE O/P EST LOW 20 MIN: CPT | Performed by: PHYSICIAN ASSISTANT

## 2020-02-24 ASSESSMENT — PATIENT HEALTH QUESTIONNAIRE - PHQ9
SUM OF ALL RESPONSES TO PHQ9 QUESTIONS 1 & 2: 0
SUM OF ALL RESPONSES TO PHQ QUESTIONS 1-9: 0
2. FEELING DOWN, DEPRESSED OR HOPELESS: 0
1. LITTLE INTEREST OR PLEASURE IN DOING THINGS: 0
SUM OF ALL RESPONSES TO PHQ QUESTIONS 1-9: 0

## 2020-02-24 ASSESSMENT — ENCOUNTER SYMPTOMS
WHEEZING: 0
COUGH: 0
CONSTIPATION: 0
SHORTNESS OF BREATH: 0
ABDOMINAL PAIN: 0
COLOR CHANGE: 0

## 2020-02-24 NOTE — PROGRESS NOTES
Visit Information    Have you changed or started any medications since your last visit including any over-the-counter medicines, vitamins, or herbal medicines? no   Have you stopped taking any of your medications? Is so, why? -  no  Are you having any side effects from any of your medications? - no    Have you seen any other physician or provider since your last visit?  no   Have you had any other diagnostic tests since your last visit?  no   Have you been seen in the emergency room and/or had an admission in a hospital since we last saw you?  no   Have you had your routine dental cleaning in the past 6 months?  no     Do you have an active MyChart account? If no, what is the barrier?   Yes    Patient Care Team:  Tanya Carlin PA-C as PCP - General (Family Medicine)  Tanya Guardian, PA-C as PCP - [facility]    Medical History Review  Past Medical, Family, and Social History reviewed and does contribute to the patient presenting condition    Health Maintenance   Topic Date Due    DTaP/Tdap/Td vaccine (1 - Tdap) 07/05/1978    HIV screen  07/05/1982    Colon cancer screen colonoscopy  07/05/2017    Lipid screen  01/23/2020    Diabetes screen  01/23/2022    Flu vaccine  Completed    Shingles Vaccine  Completed    Hepatitis A vaccine  Aged Out    Hepatitis B vaccine  Aged Out    Hib vaccine  Aged Out    Meningococcal (ACWY) vaccine  Aged Out    Pneumococcal 0-64 years Vaccine  Aged Out
Negative for chest pain and palpitations. Gastrointestinal: Negative for abdominal pain and constipation. Genitourinary: Negative for dysuria, frequency and urgency. Skin: Negative for color change, pallor, rash and wound. Neurological: Negative for weakness. Hematological: Negative for adenopathy. Psychiatric/Behavioral: Negative for sleep disturbance. The patient is not nervous/anxious. Objective:     Physical Exam  Vitals signs and nursing note reviewed. Constitutional:       General: He is not in acute distress. Appearance: Normal appearance. He is well-developed. He is not ill-appearing. HENT:      Head: Normocephalic and atraumatic. Skin:     General: Skin is warm and dry. Coloration: Skin is not pale. Findings: No erythema or rash. Neurological:      Mental Status: He is alert and oriented to person, place, and time. Motor: No weakness. Gait: Gait normal.   Psychiatric:         Mood and Affect: Mood normal.         Behavior: Behavior normal.         Thought Content: Thought content normal.         Judgment: Judgment normal.       /70   Pulse 69   Ht 6' 4\" (1.93 m)   Wt 269 lb (122 kg)   SpO2 98%   BMI 32.74 kg/m²     Assessment:          Diagnosis Orders   1. Erectile dysfunction, unspecified erectile dysfunction type  Testosterone Free and Total Male   2. Mixed hyperlipidemia  CBC Auto Differential    Comprehensive Metabolic Panel    Lipid Panel   3. Prediabetes  Hemoglobin A1C   4. Prostate cancer screening  Psa screening             Plan:      Return if symptoms worsen or fail to improve.     Will update labs including testosterone level  I asked patient to confirm with his cardiologist that there is not concern for him using medication like viagra first.   He will get back with me on this  Await results and call  We did discuss use and SE of viagra if cardio approves this  Patient agreed with treatment plan    Orders Placed This Encounter

## 2020-02-25 RX ORDER — SILDENAFIL CITRATE 20 MG/1
TABLET ORAL
Qty: 30 TABLET | Refills: 3 | Status: SHIPPED | OUTPATIENT
Start: 2020-02-25 | End: 2020-11-05 | Stop reason: SDUPTHER

## 2020-03-17 ENCOUNTER — NURSE TRIAGE (OUTPATIENT)
Dept: OTHER | Facility: CLINIC | Age: 55
End: 2020-03-17

## 2020-03-17 ENCOUNTER — TELEPHONE (OUTPATIENT)
Dept: FAMILY MEDICINE CLINIC | Age: 55
End: 2020-03-17

## 2020-03-17 NOTE — TELEPHONE ENCOUNTER
Reason for Disposition   MILD difficulty breathing (e.g., minimal/no SOB at rest, SOB with walking, pulse < 100) of new onset or worse than normal    Protocols used: BREATHING DIFFICULTY-ADULT-OH    Received call from [facility]. Caller is reporting cough, body aches, SOB and diarrhea. Denies fever. Caller does feel SOB with exertion. Does not appear to be in any immediate distress. Provided remedies to help with symptoms. Call soft transferred to [address] to schedule appointment.

## 2020-03-18 ENCOUNTER — PATIENT MESSAGE (OUTPATIENT)
Dept: FAMILY MEDICINE CLINIC | Age: 55
End: 2020-03-18

## 2020-08-29 ENCOUNTER — E-VISIT (OUTPATIENT)
Dept: FAMILY MEDICINE CLINIC | Age: 55
End: 2020-08-29
Payer: COMMERCIAL

## 2020-08-29 PROCEDURE — 99421 OL DIG E/M SVC 5-10 MIN: CPT | Performed by: PHYSICIAN ASSISTANT

## 2020-08-30 RX ORDER — METOPROLOL SUCCINATE 50 MG/1
50 TABLET, EXTENDED RELEASE ORAL DAILY
Qty: 30 TABLET | Refills: 5 | Status: ON HOLD | OUTPATIENT
Start: 2020-08-30 | End: 2020-12-28 | Stop reason: HOSPADM

## 2020-11-11 LAB
ALBUMIN SERPL-MCNC: 4 G/DL
ALP BLD-CCNC: 50 U/L
ALT SERPL-CCNC: 28 U/L
ANION GAP SERPL CALCULATED.3IONS-SCNC: 8 MMOL/L
AST SERPL-CCNC: 20 U/L
AVERAGE GLUCOSE: 114
BASOPHILS ABSOLUTE: 0 /ΜL
BASOPHILS RELATIVE PERCENT: 0.6 %
BILIRUB SERPL-MCNC: 0.7 MG/DL (ref 0.1–1.4)
BUN BLDV-MCNC: 13 MG/DL
CALCIUM SERPL-MCNC: 9.1 MG/DL
CHLORIDE BLD-SCNC: 106 MMOL/L
CHOLESTEROL, TOTAL: 99 MG/DL
CHOLESTEROL/HDL RATIO: 3.4
CO2: 26 MMOL/L
CREAT SERPL-MCNC: 0.91 MG/DL
EOSINOPHILS ABSOLUTE: 0.2 /ΜL
EOSINOPHILS RELATIVE PERCENT: 3.3 %
GFR CALCULATED: >60
GLUCOSE BLD-MCNC: 112 MG/DL
HBA1C MFR BLD: 5.6 %
HCT VFR BLD CALC: 41.9 % (ref 41–53)
HDLC SERPL-MCNC: 29 MG/DL (ref 35–70)
HEMOGLOBIN: 14.6 G/DL (ref 13.5–17.5)
LDL CHOLESTEROL CALCULATED: 51 MG/DL (ref 0–160)
LYMPHOCYTES ABSOLUTE: 1.9 /ΜL
LYMPHOCYTES RELATIVE PERCENT: 29.1 %
MCH RBC QN AUTO: 31.1 PG
MCHC RBC AUTO-ENTMCNC: 34.8 G/DL
MCV RBC AUTO: 90 FL
MONOCYTES ABSOLUTE: 0.5 /ΜL
MONOCYTES RELATIVE PERCENT: 8.4 %
NEUTROPHILS ABSOLUTE: 3.8 /ΜL
NEUTROPHILS RELATIVE PERCENT: 58.6 %
NONHDLC SERPL-MCNC: ABNORMAL MG/DL
PDW BLD-RTO: 13.6 %
PLATELET # BLD: 149 K/ΜL
PMV BLD AUTO: 8.8 FL
POTASSIUM SERPL-SCNC: 4.2 MMOL/L
PROSTATE SPECIFIC ANTIGEN: 0.18 NG/ML
RBC # BLD: 4.69 10^6/ΜL
SODIUM BLD-SCNC: 140 MMOL/L
TESTOSTERONE FREE: 65.6
TESTOSTERONE TOTAL: 331.6
TOTAL PROTEIN: 7.4
TRIGL SERPL-MCNC: 93 MG/DL
VLDLC SERPL CALC-MCNC: ABNORMAL MG/DL
WBC # BLD: 6.5 10^3/ML

## 2020-12-13 RX ORDER — ATORVASTATIN CALCIUM 80 MG/1
80 TABLET, FILM COATED ORAL DAILY
Qty: 90 TABLET | Refills: 3 | Status: SHIPPED | OUTPATIENT
Start: 2020-12-13

## 2020-12-26 ENCOUNTER — HOSPITAL ENCOUNTER (OUTPATIENT)
Age: 55
Setting detail: OBSERVATION
Discharge: HOME | End: 2020-12-28
Attending: EMERGENCY MEDICINE | Admitting: INTERNAL MEDICINE
Payer: COMMERCIAL

## 2020-12-26 ENCOUNTER — APPOINTMENT (OUTPATIENT)
Dept: GENERAL RADIOLOGY | Age: 55
End: 2020-12-26
Payer: COMMERCIAL

## 2020-12-26 PROBLEM — R07.9 CHEST PAIN: Status: ACTIVE | Noted: 2020-12-26

## 2020-12-26 LAB
ABSOLUTE EOS #: 0.2 K/UL (ref 0–0.44)
ABSOLUTE IMMATURE GRANULOCYTE: 0.03 K/UL (ref 0–0.3)
ABSOLUTE LYMPH #: 0.9 K/UL (ref 1.1–3.7)
ABSOLUTE MONO #: 0.51 K/UL (ref 0.1–1.2)
ANION GAP SERPL CALCULATED.3IONS-SCNC: 12 MMOL/L (ref 9–17)
BASOPHILS # BLD: 0 % (ref 0–2)
BASOPHILS ABSOLUTE: 0.04 K/UL (ref 0–0.2)
BUN BLDV-MCNC: 14 MG/DL (ref 6–20)
BUN/CREAT BLD: 16 (ref 9–20)
CALCIUM SERPL-MCNC: 9.2 MG/DL (ref 8.6–10.4)
CHLORIDE BLD-SCNC: 102 MMOL/L (ref 98–107)
CO2: 23 MMOL/L (ref 20–31)
CREAT SERPL-MCNC: 0.89 MG/DL (ref 0.7–1.2)
D-DIMER QUANTITATIVE: 0.34 MG/L FEU (ref 0–0.59)
DIFFERENTIAL TYPE: ABNORMAL
EOSINOPHILS RELATIVE PERCENT: 2 % (ref 1–4)
GFR AFRICAN AMERICAN: >60 ML/MIN
GFR NON-AFRICAN AMERICAN: >60 ML/MIN
GFR SERPL CREATININE-BSD FRML MDRD: ABNORMAL ML/MIN/{1.73_M2}
GFR SERPL CREATININE-BSD FRML MDRD: ABNORMAL ML/MIN/{1.73_M2}
GLUCOSE BLD-MCNC: 136 MG/DL (ref 70–99)
HCT VFR BLD CALC: 42.5 % (ref 40.7–50.3)
HEMOGLOBIN: 14.5 G/DL (ref 13–17)
IMMATURE GRANULOCYTES: 0 %
LYMPHOCYTES # BLD: 10 % (ref 24–43)
MCH RBC QN AUTO: 30.7 PG (ref 25.2–33.5)
MCHC RBC AUTO-ENTMCNC: 34.1 G/DL (ref 28.4–34.8)
MCV RBC AUTO: 89.9 FL (ref 82.6–102.9)
MONOCYTES # BLD: 5 % (ref 3–12)
MYOGLOBIN: 42 NG/ML (ref 28–72)
NRBC AUTOMATED: 0 PER 100 WBC
PDW BLD-RTO: 12.5 % (ref 11.8–14.4)
PLATELET # BLD: 136 K/UL (ref 138–453)
PLATELET ESTIMATE: ABNORMAL
PMV BLD AUTO: 9.7 FL (ref 8.1–13.5)
POTASSIUM SERPL-SCNC: 3.9 MMOL/L (ref 3.7–5.3)
RBC # BLD: 4.73 M/UL (ref 4.21–5.77)
RBC # BLD: ABNORMAL 10*6/UL
SARS-COV-2, RAPID: NOT DETECTED
SARS-COV-2: NORMAL
SARS-COV-2: NORMAL
SEG NEUTROPHILS: 83 % (ref 36–65)
SEGMENTED NEUTROPHILS ABSOLUTE COUNT: 7.73 K/UL (ref 1.5–8.1)
SODIUM BLD-SCNC: 137 MMOL/L (ref 135–144)
SOURCE: NORMAL
TROPONIN INTERP: NORMAL
TROPONIN T: NORMAL NG/ML
TROPONIN, HIGH SENSITIVITY: <6 NG/L (ref 0–22)
WBC # BLD: 9.4 K/UL (ref 3.5–11.3)
WBC # BLD: ABNORMAL 10*3/UL

## 2020-12-26 PROCEDURE — 84484 ASSAY OF TROPONIN QUANT: CPT

## 2020-12-26 PROCEDURE — U0002 COVID-19 LAB TEST NON-CDC: HCPCS

## 2020-12-26 PROCEDURE — 71045 X-RAY EXAM CHEST 1 VIEW: CPT

## 2020-12-26 PROCEDURE — 80048 BASIC METABOLIC PNL TOTAL CA: CPT

## 2020-12-26 PROCEDURE — 93005 ELECTROCARDIOGRAM TRACING: CPT | Performed by: NURSE PRACTITIONER

## 2020-12-26 PROCEDURE — 6370000000 HC RX 637 (ALT 250 FOR IP): Performed by: NURSE PRACTITIONER

## 2020-12-26 PROCEDURE — 85025 COMPLETE CBC W/AUTO DIFF WBC: CPT

## 2020-12-26 PROCEDURE — 83874 ASSAY OF MYOGLOBIN: CPT

## 2020-12-26 PROCEDURE — G0378 HOSPITAL OBSERVATION PER HR: HCPCS

## 2020-12-26 PROCEDURE — 99283 EMERGENCY DEPT VISIT LOW MDM: CPT

## 2020-12-26 PROCEDURE — 85379 FIBRIN DEGRADATION QUANT: CPT

## 2020-12-26 PROCEDURE — 6360000002 HC RX W HCPCS: Performed by: NURSE PRACTITIONER

## 2020-12-26 PROCEDURE — U0003 INFECTIOUS AGENT DETECTION BY NUCLEIC ACID (DNA OR RNA); SEVERE ACUTE RESPIRATORY SYNDROME CORONAVIRUS 2 (SARS-COV-2) (CORONAVIRUS DISEASE [COVID-19]), AMPLIFIED PROBE TECHNIQUE, MAKING USE OF HIGH THROUGHPUT TECHNOLOGIES AS DESCRIBED BY CMS-2020-01-R: HCPCS

## 2020-12-26 PROCEDURE — 96374 THER/PROPH/DIAG INJ IV PUSH: CPT

## 2020-12-26 PROCEDURE — 96375 TX/PRO/DX INJ NEW DRUG ADDON: CPT

## 2020-12-26 RX ORDER — ACETAMINOPHEN 500 MG
1000 TABLET ORAL ONCE
Status: COMPLETED | OUTPATIENT
Start: 2020-12-26 | End: 2020-12-26

## 2020-12-26 RX ORDER — ONDANSETRON 2 MG/ML
4 INJECTION INTRAMUSCULAR; INTRAVENOUS ONCE
Status: COMPLETED | OUTPATIENT
Start: 2020-12-26 | End: 2020-12-26

## 2020-12-26 RX ORDER — MORPHINE SULFATE 4 MG/ML
4 INJECTION, SOLUTION INTRAMUSCULAR; INTRAVENOUS ONCE
Status: COMPLETED | OUTPATIENT
Start: 2020-12-26 | End: 2020-12-26

## 2020-12-26 RX ADMIN — ACETAMINOPHEN 1000 MG: 500 TABLET ORAL at 23:30

## 2020-12-26 RX ADMIN — MORPHINE SULFATE 4 MG: 4 INJECTION, SOLUTION INTRAMUSCULAR; INTRAVENOUS at 21:55

## 2020-12-26 RX ADMIN — ONDANSETRON 4 MG: 2 INJECTION INTRAMUSCULAR; INTRAVENOUS at 21:55

## 2020-12-26 ASSESSMENT — PAIN SCALES - GENERAL
PAINLEVEL_OUTOF10: 0
PAINLEVEL_OUTOF10: 5
PAINLEVEL_OUTOF10: 5

## 2020-12-26 ASSESSMENT — ENCOUNTER SYMPTOMS
COLOR CHANGE: 0
SINUS PRESSURE: 0
SORE THROAT: 0
DIARRHEA: 0
WHEEZING: 0
NAUSEA: 0
CONSTIPATION: 0
VOMITING: 0
COUGH: 0
RHINORRHEA: 0
SHORTNESS OF BREATH: 0
ABDOMINAL PAIN: 0

## 2020-12-26 ASSESSMENT — PAIN DESCRIPTION - LOCATION: LOCATION: CHEST

## 2020-12-26 ASSESSMENT — HEART SCORE: ECG: 0

## 2020-12-26 ASSESSMENT — PAIN DESCRIPTION - DESCRIPTORS: DESCRIPTORS: CONSTANT;PRESSURE

## 2020-12-26 ASSESSMENT — PAIN DESCRIPTION - PAIN TYPE: TYPE: ACUTE PAIN

## 2020-12-27 PROBLEM — I21.9 HEART ATTACK (HCC): Chronic | Status: ACTIVE | Noted: 2018-03-11

## 2020-12-27 PROBLEM — I25.10 CORONARY ARTERY DISEASE INVOLVING NATIVE HEART WITHOUT ANGINA PECTORIS: Chronic | Status: ACTIVE | Noted: 2018-11-06

## 2020-12-27 PROBLEM — I21.4 NON-ST ELEVATION MYOCARDIAL INFARCTION (NSTEMI) (HCC): Chronic | Status: ACTIVE | Noted: 2018-03-11

## 2020-12-27 PROBLEM — I21.9 HEART ATTACK (HCC): Status: ACTIVE | Noted: 2018-03-11

## 2020-12-27 PROBLEM — K21.9 GERD (GASTROESOPHAGEAL REFLUX DISEASE): Chronic | Status: ACTIVE | Noted: 2018-03-12

## 2020-12-27 PROBLEM — G47.33 OBSTRUCTIVE SLEEP APNEA SYNDROME: Chronic | Status: ACTIVE | Noted: 2020-02-24

## 2020-12-27 LAB
-: ABNORMAL
ADENOVIRUS PCR: NOT DETECTED
ALBUMIN SERPL-MCNC: 3.6 G/DL (ref 3.5–5.2)
ALBUMIN/GLOBULIN RATIO: ABNORMAL (ref 1–2.5)
ALP BLD-CCNC: 44 U/L (ref 40–129)
ALT SERPL-CCNC: 37 U/L (ref 5–41)
AMORPHOUS: ABNORMAL
ANION GAP SERPL CALCULATED.3IONS-SCNC: 10 MMOL/L (ref 9–17)
AST SERPL-CCNC: 22 U/L
BACTERIA: ABNORMAL
BILIRUB SERPL-MCNC: 0.69 MG/DL (ref 0.3–1.2)
BILIRUBIN URINE: NEGATIVE
BORDETELLA PARAPERTUSSIS: NOT DETECTED
BORDETELLA PERTUSSIS PCR: NOT DETECTED
BUN BLDV-MCNC: 15 MG/DL (ref 6–20)
BUN/CREAT BLD: 19 (ref 9–20)
CALCIUM SERPL-MCNC: 8.9 MG/DL (ref 8.6–10.4)
CASTS UA: ABNORMAL /LPF
CHLAMYDIA PNEUMONIAE BY PCR: NOT DETECTED
CHLORIDE BLD-SCNC: 102 MMOL/L (ref 98–107)
CHOLESTEROL/HDL RATIO: 4.2
CHOLESTEROL: 109 MG/DL
CO2: 23 MMOL/L (ref 20–31)
COLOR: ABNORMAL
COMMENT UA: ABNORMAL
CORONAVIRUS 229E PCR: NOT DETECTED
CORONAVIRUS HKU1 PCR: NOT DETECTED
CORONAVIRUS NL63 PCR: NOT DETECTED
CORONAVIRUS OC43 PCR: NOT DETECTED
CREAT SERPL-MCNC: 0.78 MG/DL (ref 0.7–1.2)
CRYSTALS, UA: ABNORMAL /HPF
EKG ATRIAL RATE: 109 BPM
EKG P AXIS: 37 DEGREES
EKG P-R INTERVAL: 158 MS
EKG Q-T INTERVAL: 312 MS
EKG QRS DURATION: 86 MS
EKG QTC CALCULATION (BAZETT): 420 MS
EKG R AXIS: 11 DEGREES
EKG T AXIS: 58 DEGREES
EKG VENTRICULAR RATE: 109 BPM
EPITHELIAL CELLS UA: ABNORMAL /HPF (ref 0–5)
ESTIMATED AVERAGE GLUCOSE: 111 MG/DL
GFR AFRICAN AMERICAN: >60 ML/MIN
GFR NON-AFRICAN AMERICAN: >60 ML/MIN
GFR SERPL CREATININE-BSD FRML MDRD: ABNORMAL ML/MIN/{1.73_M2}
GFR SERPL CREATININE-BSD FRML MDRD: ABNORMAL ML/MIN/{1.73_M2}
GLUCOSE BLD-MCNC: 126 MG/DL (ref 70–99)
GLUCOSE URINE: NEGATIVE
HBA1C MFR BLD: 5.5 % (ref 4–6)
HCT VFR BLD CALC: 40.8 % (ref 40.7–50.3)
HDLC SERPL-MCNC: 26 MG/DL
HEMOGLOBIN: 13.6 G/DL (ref 13–17)
HUMAN METAPNEUMOVIRUS PCR: NOT DETECTED
INFLUENZA A BY PCR: NOT DETECTED
INFLUENZA A H1 (2009) PCR: NORMAL
INFLUENZA A H1 PCR: NORMAL
INFLUENZA A H3 PCR: NORMAL
INFLUENZA B BY PCR: NOT DETECTED
KETONES, URINE: ABNORMAL
LDL CHOLESTEROL: 53 MG/DL (ref 0–130)
LEUKOCYTE ESTERASE, URINE: NEGATIVE
MAGNESIUM: 1.8 MG/DL (ref 1.6–2.6)
MCH RBC QN AUTO: 31.1 PG (ref 25.2–33.5)
MCHC RBC AUTO-ENTMCNC: 33.3 G/DL (ref 28.4–34.8)
MCV RBC AUTO: 93.2 FL (ref 82.6–102.9)
MUCUS: ABNORMAL
MYCOPLASMA PNEUMONIAE PCR: NOT DETECTED
NITRITE, URINE: NEGATIVE
NRBC AUTOMATED: 0 PER 100 WBC
OTHER OBSERVATIONS UA: ABNORMAL
PARAINFLUENZA 1 PCR: NOT DETECTED
PARAINFLUENZA 2 PCR: NOT DETECTED
PARAINFLUENZA 3 PCR: NOT DETECTED
PARAINFLUENZA 4 PCR: NOT DETECTED
PDW BLD-RTO: 12.6 % (ref 11.8–14.4)
PH UA: 6 (ref 5–8)
PLATELET # BLD: 137 K/UL (ref 138–453)
PMV BLD AUTO: 9.9 FL (ref 8.1–13.5)
POTASSIUM SERPL-SCNC: 4 MMOL/L (ref 3.7–5.3)
PROTEIN UA: NEGATIVE
RBC # BLD: 4.38 M/UL (ref 4.21–5.77)
RBC UA: ABNORMAL /HPF (ref 0–2)
RENAL EPITHELIAL, UA: ABNORMAL /HPF
RESP SYNCYTIAL VIRUS PCR: NOT DETECTED
RHINO/ENTEROVIRUS PCR: NOT DETECTED
SARS-COV-2, PCR: NOT DETECTED
SODIUM BLD-SCNC: 135 MMOL/L (ref 135–144)
SPECIFIC GRAVITY UA: 1.02 (ref 1–1.03)
SPECIMEN DESCRIPTION: NORMAL
TOTAL PROTEIN: 6.7 G/DL (ref 6.4–8.3)
TRICHOMONAS: ABNORMAL
TRIGL SERPL-MCNC: 151 MG/DL
TROPONIN INTERP: NORMAL
TROPONIN INTERP: NORMAL
TROPONIN T: NORMAL NG/ML
TROPONIN T: NORMAL NG/ML
TROPONIN, HIGH SENSITIVITY: <6 NG/L (ref 0–22)
TROPONIN, HIGH SENSITIVITY: <6 NG/L (ref 0–22)
TURBIDITY: CLEAR
URINE HGB: NEGATIVE
UROBILINOGEN, URINE: NORMAL
VLDLC SERPL CALC-MCNC: ABNORMAL MG/DL (ref 1–30)
WBC # BLD: 7.8 K/UL (ref 3.5–11.3)
WBC UA: ABNORMAL /HPF (ref 0–5)
YEAST: ABNORMAL

## 2020-12-27 PROCEDURE — 83735 ASSAY OF MAGNESIUM: CPT

## 2020-12-27 PROCEDURE — 85027 COMPLETE CBC AUTOMATED: CPT

## 2020-12-27 PROCEDURE — 84484 ASSAY OF TROPONIN QUANT: CPT

## 2020-12-27 PROCEDURE — 96372 THER/PROPH/DIAG INJ SC/IM: CPT

## 2020-12-27 PROCEDURE — 93005 ELECTROCARDIOGRAM TRACING: CPT | Performed by: NURSE PRACTITIONER

## 2020-12-27 PROCEDURE — G0378 HOSPITAL OBSERVATION PER HR: HCPCS

## 2020-12-27 PROCEDURE — 36415 COLL VENOUS BLD VENIPUNCTURE: CPT

## 2020-12-27 PROCEDURE — 6370000000 HC RX 637 (ALT 250 FOR IP): Performed by: NURSE PRACTITIONER

## 2020-12-27 PROCEDURE — 93010 ELECTROCARDIOGRAM REPORT: CPT | Performed by: INTERNAL MEDICINE

## 2020-12-27 PROCEDURE — 80053 COMPREHEN METABOLIC PANEL: CPT

## 2020-12-27 PROCEDURE — 96375 TX/PRO/DX INJ NEW DRUG ADDON: CPT

## 2020-12-27 PROCEDURE — 80061 LIPID PANEL: CPT

## 2020-12-27 PROCEDURE — 81001 URINALYSIS AUTO W/SCOPE: CPT

## 2020-12-27 PROCEDURE — 99218 PR INITIAL OBSERVATION CARE/DAY 30 MINUTES: CPT | Performed by: NURSE PRACTITIONER

## 2020-12-27 PROCEDURE — 6360000002 HC RX W HCPCS: Performed by: NURSE PRACTITIONER

## 2020-12-27 PROCEDURE — 0202U NFCT DS 22 TRGT SARS-COV-2: CPT

## 2020-12-27 PROCEDURE — 83036 HEMOGLOBIN GLYCOSYLATED A1C: CPT

## 2020-12-27 PROCEDURE — 2580000003 HC RX 258: Performed by: NURSE PRACTITIONER

## 2020-12-27 PROCEDURE — 2500000003 HC RX 250 WO HCPCS: Performed by: NURSE PRACTITIONER

## 2020-12-27 RX ORDER — CETIRIZINE HYDROCHLORIDE 10 MG/1
10 TABLET ORAL DAILY
Status: DISCONTINUED | OUTPATIENT
Start: 2020-12-27 | End: 2020-12-28 | Stop reason: HOSPADM

## 2020-12-27 RX ORDER — POTASSIUM CHLORIDE 7.45 MG/ML
10 INJECTION INTRAVENOUS PRN
Status: DISCONTINUED | OUTPATIENT
Start: 2020-12-27 | End: 2020-12-28 | Stop reason: HOSPADM

## 2020-12-27 RX ORDER — METOPROLOL TARTRATE 5 MG/5ML
5 INJECTION INTRAVENOUS ONCE
Status: COMPLETED | OUTPATIENT
Start: 2020-12-27 | End: 2020-12-27

## 2020-12-27 RX ORDER — SODIUM CHLORIDE 0.9 % (FLUSH) 0.9 %
10 SYRINGE (ML) INJECTION PRN
Status: DISCONTINUED | OUTPATIENT
Start: 2020-12-27 | End: 2020-12-28 | Stop reason: HOSPADM

## 2020-12-27 RX ORDER — ACETAMINOPHEN 650 MG/1
650 SUPPOSITORY RECTAL EVERY 6 HOURS PRN
Status: DISCONTINUED | OUTPATIENT
Start: 2020-12-27 | End: 2020-12-28 | Stop reason: HOSPADM

## 2020-12-27 RX ORDER — MAGNESIUM SULFATE 1 G/100ML
1 INJECTION INTRAVENOUS PRN
Status: DISCONTINUED | OUTPATIENT
Start: 2020-12-27 | End: 2020-12-28 | Stop reason: HOSPADM

## 2020-12-27 RX ORDER — ATORVASTATIN CALCIUM 80 MG/1
80 TABLET, FILM COATED ORAL DAILY
Status: DISCONTINUED | OUTPATIENT
Start: 2020-12-27 | End: 2020-12-28 | Stop reason: HOSPADM

## 2020-12-27 RX ORDER — POTASSIUM CHLORIDE 20 MEQ/1
40 TABLET, EXTENDED RELEASE ORAL PRN
Status: DISCONTINUED | OUTPATIENT
Start: 2020-12-27 | End: 2020-12-28 | Stop reason: HOSPADM

## 2020-12-27 RX ORDER — CLOPIDOGREL BISULFATE 75 MG/1
75 TABLET ORAL DAILY
Status: DISCONTINUED | OUTPATIENT
Start: 2020-12-27 | End: 2020-12-28 | Stop reason: HOSPADM

## 2020-12-27 RX ORDER — PANTOPRAZOLE SODIUM 40 MG/1
40 TABLET, DELAYED RELEASE ORAL DAILY
Status: DISCONTINUED | OUTPATIENT
Start: 2020-12-27 | End: 2020-12-28 | Stop reason: HOSPADM

## 2020-12-27 RX ORDER — ASPIRIN 81 MG/1
81 TABLET ORAL DAILY
Status: DISCONTINUED | OUTPATIENT
Start: 2020-12-27 | End: 2020-12-28 | Stop reason: HOSPADM

## 2020-12-27 RX ORDER — METOPROLOL SUCCINATE 50 MG/1
100 TABLET, EXTENDED RELEASE ORAL DAILY
Status: DISCONTINUED | OUTPATIENT
Start: 2020-12-27 | End: 2020-12-28 | Stop reason: HOSPADM

## 2020-12-27 RX ORDER — NITROGLYCERIN 0.4 MG/1
0.4 TABLET SUBLINGUAL EVERY 5 MIN PRN
Status: DISCONTINUED | OUTPATIENT
Start: 2020-12-27 | End: 2020-12-28 | Stop reason: HOSPADM

## 2020-12-27 RX ORDER — ONDANSETRON 2 MG/ML
4 INJECTION INTRAMUSCULAR; INTRAVENOUS EVERY 6 HOURS PRN
Status: DISCONTINUED | OUTPATIENT
Start: 2020-12-27 | End: 2020-12-28 | Stop reason: HOSPADM

## 2020-12-27 RX ORDER — POTASSIUM CHLORIDE 7.45 MG/ML
10 INJECTION INTRAVENOUS PRN
Status: DISCONTINUED | OUTPATIENT
Start: 2020-12-27 | End: 2020-12-27 | Stop reason: SDUPTHER

## 2020-12-27 RX ORDER — SODIUM CHLORIDE 0.9 % (FLUSH) 0.9 %
10 SYRINGE (ML) INJECTION EVERY 12 HOURS SCHEDULED
Status: DISCONTINUED | OUTPATIENT
Start: 2020-12-27 | End: 2020-12-28 | Stop reason: HOSPADM

## 2020-12-27 RX ORDER — ACETAMINOPHEN 325 MG/1
650 TABLET ORAL EVERY 6 HOURS PRN
Status: DISCONTINUED | OUTPATIENT
Start: 2020-12-27 | End: 2020-12-28 | Stop reason: HOSPADM

## 2020-12-27 RX ORDER — PROMETHAZINE HYDROCHLORIDE 12.5 MG/1
12.5 TABLET ORAL EVERY 6 HOURS PRN
Status: DISCONTINUED | OUTPATIENT
Start: 2020-12-27 | End: 2020-12-28 | Stop reason: HOSPADM

## 2020-12-27 RX ADMIN — ENOXAPARIN SODIUM 30 MG: 30 INJECTION SUBCUTANEOUS at 20:39

## 2020-12-27 RX ADMIN — METOPROLOL SUCCINATE 100 MG: 50 TABLET, EXTENDED RELEASE ORAL at 08:25

## 2020-12-27 RX ADMIN — CETIRIZINE HYDROCHLORIDE 10 MG: 10 TABLET, FILM COATED ORAL at 08:27

## 2020-12-27 RX ADMIN — ASPIRIN 81 MG: 81 TABLET, COATED ORAL at 08:25

## 2020-12-27 RX ADMIN — METOPROLOL TARTRATE 5 MG: 5 INJECTION INTRAVENOUS at 06:54

## 2020-12-27 RX ADMIN — CLOPIDOGREL BISULFATE 75 MG: 75 TABLET ORAL at 08:27

## 2020-12-27 RX ADMIN — ENOXAPARIN SODIUM 30 MG: 30 INJECTION SUBCUTANEOUS at 08:27

## 2020-12-27 RX ADMIN — ACETAMINOPHEN 650 MG: 325 TABLET ORAL at 20:39

## 2020-12-27 RX ADMIN — PANTOPRAZOLE SODIUM 40 MG: 40 TABLET, DELAYED RELEASE ORAL at 06:54

## 2020-12-27 RX ADMIN — ACETAMINOPHEN 650 MG: 325 TABLET ORAL at 10:50

## 2020-12-27 RX ADMIN — ATORVASTATIN CALCIUM 80 MG: 80 TABLET, FILM COATED ORAL at 08:27

## 2020-12-27 RX ADMIN — Medication 10 ML: at 08:27

## 2020-12-27 ASSESSMENT — ENCOUNTER SYMPTOMS
ABDOMINAL PAIN: 0
SHORTNESS OF BREATH: 1
DIARRHEA: 0
WHEEZING: 0
STRIDOR: 0
COUGH: 0
BLOOD IN STOOL: 0
CONSTIPATION: 0
VOMITING: 0
NAUSEA: 1

## 2020-12-27 ASSESSMENT — PAIN SCALES - GENERAL
PAINLEVEL_OUTOF10: 0
PAINLEVEL_OUTOF10: 3
PAINLEVEL_OUTOF10: 0
PAINLEVEL_OUTOF10: 8
PAINLEVEL_OUTOF10: 3

## 2020-12-27 ASSESSMENT — PAIN DESCRIPTION - PROGRESSION: CLINICAL_PROGRESSION: GRADUALLY WORSENING

## 2020-12-27 ASSESSMENT — PAIN - FUNCTIONAL ASSESSMENT: PAIN_FUNCTIONAL_ASSESSMENT: ACTIVITIES ARE NOT PREVENTED

## 2020-12-27 ASSESSMENT — PAIN DESCRIPTION - ORIENTATION: ORIENTATION: POSTERIOR;LOWER

## 2020-12-27 ASSESSMENT — PAIN DESCRIPTION - DESCRIPTORS: DESCRIPTORS: HEADACHE

## 2020-12-27 ASSESSMENT — PAIN DESCRIPTION - ONSET: ONSET: GRADUAL

## 2020-12-27 ASSESSMENT — PAIN DESCRIPTION - PAIN TYPE: TYPE: ACUTE PAIN

## 2020-12-27 ASSESSMENT — PAIN DESCRIPTION - FREQUENCY: FREQUENCY: INTERMITTENT

## 2020-12-27 ASSESSMENT — PAIN DESCRIPTION - LOCATION: LOCATION: HEAD

## 2020-12-27 NOTE — PLAN OF CARE
Problem: Pain:  Description: Pain management should include both nonpharmacologic and pharmacologic interventions. Goal: Pain level will decrease  Description: Pain level will decrease  12/27/2020 1151 by Deandra Amaral RN  Note: Pain level assessment complete and patient is complaining of headache and rating it at 8/10. Patient educated on pain scale and control interventions  PRN pain medication given per patient request  Patient instructed to call out with new onset of pain or unrelieved pain    12/27/2020 0837 by Deandra Amaral RN  Outcome: Ongoing  Note: Patient currently denies any chest pain upon assessment.   Patient educated on pain scale and control interventions  No PRN pain medication given   Patient instructed to call out with new onset of pain or unrelieved pain    12/27/2020 0259 by Heidi Iqbal RN  Outcome: Ongoing  Goal: Control of acute pain  Description: Control of acute pain  12/27/2020 1151 by Deandra Amaral RN  Outcome: Ongoing  12/27/2020 0837 by Deandra Amaral RN  Outcome: Ongoing  12/27/2020 0259 by Heidi Iqbal RN  Outcome: Ongoing  Goal: Control of chronic pain  Description: Control of chronic pain  12/27/2020 1151 by Deandra Amaral RN  Outcome: Ongoing  12/27/2020 0837 by Deandra Amaral RN  Outcome: Ongoing  12/27/2020 0259 by Heidi Iqbal RN  Outcome: Ongoing

## 2020-12-27 NOTE — PLAN OF CARE
Problem: Pain:  Goal: Pain level will decrease  Description: Pain level will decrease  Outcome: Ongoing     Problem: Pain:  Goal: Control of acute pain  Description: Control of acute pain  Outcome: Ongoing     Problem: Pain:  Goal: Control of chronic pain  Description: Control of chronic pain  Outcome: Ongoing     Problem: Safety:  Goal: Free from accidental physical injury  Description: Free from accidental physical injury  Outcome: Ongoing     Problem: Safety:  Goal: Free from intentional harm  Description: Free from intentional harm  Outcome: Ongoing     Problem: Daily Care:  Goal: Daily care needs are met  Description: Daily care needs are met  Outcome: Ongoing     Problem: Discharge Planning:  Goal: Patients continuum of care needs are met  Description: Patients continuum of care needs are met  Outcome: Ongoing

## 2020-12-27 NOTE — FLOWSHEET NOTE
Patient states he is well. States Stress test tomorrow. Nom major needs.  shared in presence, listening, prayer. Follow up as needed. 12/27/20 4076   Encounter Summary   Services provided to: Patient   Referral/Consult From: Rounding   Support System Spouse   Continue Visiting   (12-27-20)   Complexity of Encounter Low   Length of Encounter 15 minutes   Spiritual Assessment Completed Yes   Routine   Type Initial   Assessment Calm; Approachable   Intervention Explored feelings, thoughts, concerns;Prayer;Discussed illness/injury and it's impact; Discussed belief system/Muslim practices/braxton   Outcome Expressed gratitude;Receptive;Engaged in conversation;Expressed feelings/needs/concerns

## 2020-12-27 NOTE — PLAN OF CARE
Problem: Pain:  Description: Pain management should include both nonpharmacologic and pharmacologic interventions. Goal: Pain level will decrease  Description: Pain level will decrease  12/27/2020 0837 by Hill Thompson RN  Outcome: Ongoing  Note: Patient currently denies any chest pain upon assessment.   Patient educated on pain scale and control interventions  No PRN pain medication given   Patient instructed to call out with new onset of pain or unrelieved pain    12/27/2020 0259 by Percy Giraldo RN  Outcome: Ongoing  Goal: Control of acute pain  Description: Control of acute pain  12/27/2020 0837 by Hill Thompson RN  Outcome: Ongoing  12/27/2020 0259 by Percy Giraldo RN  Outcome: Ongoing  Goal: Control of chronic pain  Description: Control of chronic pain  12/27/2020 0837 by Hill Thompson RN  Outcome: Ongoing  12/27/2020 0259 by Percy Giraldo RN  Outcome: Ongoing     Problem: Safety:  Goal: Free from accidental physical injury  Description: Free from accidental physical injury  12/27/2020 0837 by Hill Thompson RN  Outcome: Ongoing  12/27/2020 0259 by Percy Giraldo RN  Outcome: Ongoing  Goal: Free from intentional harm  Description: Free from intentional harm  12/27/2020 0837 by Hill Thompson RN  Outcome: Ongoing  12/27/2020 0259 by Percy Giraldo RN  Outcome: Ongoing     Problem: Daily Care:  Goal: Daily care needs are met  Description: Daily care needs are met  12/27/2020 0837 by Hill Thompson RN  Outcome: Ongoing  12/27/2020 0259 by Percy Giraldo RN  Outcome: Ongoing     Problem: Discharge Planning:  Goal: Patients continuum of care needs are met  Description: Patients continuum of care needs are met  12/27/2020 0837 by Hill Thompson RN  Outcome: Ongoing  12/27/2020 0259 by Percy Giraldo RN  Outcome: Ongoing

## 2020-12-27 NOTE — PROGRESS NOTES
Patient weight is between 101-149kg. For prophylaxis with Enoxaparin, Pharmacy adjusted the dose to account for the patient's increased body weight in accordance with hospital approved protocol. The dose has been changed to 30mg BID. Please contact pharmacy with any concerns @ [phone]. Thank you.    YULISSA PARKER  12/27/2020 12:15 AM

## 2020-12-27 NOTE — PROGRESS NOTES
Pt admitted to room 2023 per wheelchair in stable condition from ED   Oriented to room and surroundings  Bed in lowest position, wheels locked, 2/4 side rails up  Call light in reach, room free of clutter, adequate lighting provided  Denies any further questions at this time  Instructed to call out with any questions/concerns/new onset of pain and/or n/v   White board updated  Continue to monitor with hourly rounding  Bed alarm on/Fall Risk signs in place/Fall risk sticker to wrist band  Non-skid socks on/at bedside

## 2020-12-27 NOTE — H&P
[facility]  Office: [address], DO, Sid Mayen, DO, Isacc Goodman, DO, Rosa Elena Canada, DO, Abril Smiht MD, Jeniffer Cross MD, Garry Curry MD, Sarah Carmen MD, Sunil Perla MD, Zakia Crespo MD, Chula Rob MD, Sky Choi MD, Jhon Zhou MD, Julee Ly DO, Rosa Isela Ortiz MD, Serg Vera MD, Media Sensor, DO, Sandra Sullivan MD,  Denise Gutierrez DO, Birdie Alcantara MD, Dana Chaparro MD, Jayant Womack, [facility], [facility], CNP, Cezar Hernandez, CNP, Brianna Hoyos, CNS, Taylor Monte, CNP, Chelsey José, CNP, Rupali Asencio, CNP, Merlinda Goring, CNP, Mary Lewis, CNP, No Augustine PA-C, Jennifer Renner, [facility], Alex Cody, CNP, Ananda, CNP, Kelsey Yoder, CNP, Cielo Parker, CNP, Melly Beckford, CNP         [facility]    HISTORY AND PHYSICAL EXAMINATION            Date:   12/27/2020  Patient name:  Kassidy Balderrama  Date of admission:  12/26/2020  9:30 PM  MRN:   [id number]  Account:  [de-identified]  YOB: 1965  PCP:    Casper Samuel PA-C  Room:   2023/2023-01  Code Status:    Full Code    Chief Complaint:     Chief Complaint   Patient presents with    Chest Pain     x3 stents placed in 3/2018, mid-sternal chest pain started x1 hour ago, radiates straight into back    Shortness of Breath    Nausea       History Obtained From:     patient, spouse    History of Present Illness:     Kassidy Balderrama is a 50 y.o. Non-/non  male who presents with Chest Pain (x3 stents placed in 3/2018, mid-sternal chest pain started x1 hour ago, radiates straight into back), Shortness of Breath, and Nausea   and is admitted to the hospital for the management of Chest pain. Patient presented to the emergency department with complaints of chest pain that started after an episode of severe chills that all started about an hour before arrival to the ER.   He reports having severe chills that led into midsternal chest pain that he can feel through his shoulder blades with associated nausea. He denies shortness of breath or diaphoresis during this episode. He states he took a nitroglycerin prior to leaving to come to the hospital and has had mild improvement in the chest pain and chills. He stated his pain continued when he initially got to the ER but after dose of morphine he felt better. Patient states he has a history of an NSTEMI in March 2018 and had 3 stents placed. Per care everywhere he had a second cardiac cath in November 2019 that showed his stents were patent but he had a 71% ostial first obtuse marginal stenosis that was treated medically. He states he had that catheterization because he has had intermittent/ongoing shortness of breath since his initial heart attack in 2018. He states he was just seen by his cardiologist at [facility] a few days ago and his metoprolol XL dose was increased from 50 mg daily to 100. On arrival to the ER temp is 100.7, respiratory rate was 22 pulse was 115 blood pressure was normal.  Sat was 98% on room air. Chest x-ray shows no acute abnormality. Troponin less than 6 x's 2. His initial EKG read (poor quality) sinus tachycardia with possible Left atrial enlargement. Second EKG Normal sinus rhythm. I consulted cardiology and spoke with Dr. Isacc Cevallos who agrees a stress test should be done tomorrow morning.        Past Medical History:     Past Medical History:   Diagnosis Date    Allergic rhinitis     Coronary artery disease involving native heart without angina pectoris 11/6/2018    GERD (gastroesophageal reflux disease)     Heart attack ([facility] [address] 03/12/2018    Patient had 3 stents placed    NSTEMI (non-ST elevated myocardial infarction) ([facility] [address] 3/23/2018        Past Surgical History:     Past Surgical History:   Procedure Laterality Date    CORONARY ANGIOPLASTY WITH STENT PLACEMENT  03/12/2018    3 stents xience alpine 4.0 x 18 mm LAD    KNEE SURGERY Left at 26 yo        Medications Prior to Admission:     Prior to Admission medications    Medication Sig Start Date End Date Taking? Authorizing Provider   atorvastatin (LIPITOR) 80 MG tablet Take 1 tablet by mouth daily 20  Yes Bettie Winn PA-C   clopidogrel (PLAVIX) 75 MG tablet Take 1 tablet by mouth daily 20  Yes AURELIANO Sloan CNP   pantoprazole (PROTONIX) 40 MG tablet Take 1 tablet by mouth daily 20  Yes Bettie Winn PA-C   metoprolol succinate (TOPROL XL) 50 MG extended release tablet Take 1 tablet by mouth daily  Patient taking differently: Take 100 mg by mouth daily  20  Yes Bettie Winn PA-C   SM ASPIRIN ADULT LOW STRENGTH 81 MG EC tablet TAKE 1 TABLET BY MOUTH ONE TIME A DAY  20  Yes Bettie Winn PA-C   nitroGLYCERIN (NITROSTAT) 0.4 MG SL tablet Place 1 tablet under the tongue every 5 minutes as needed for Chest pain up to max of 3 total doses. If no relief after 1 dose, call 911. 3/23/18  Yes Bettie Winn PA-C   sildenafil (REVATIO) 20 MG tablet Take 2-3 tabs as needed prior to sexual activity 20   AURELIANO Sloan CNP   loratadine (CLARITIN) 10 MG capsule Take 1 capsule by mouth daily 19   Rodolfo Mcclain PA-C        Allergies:     Patient has no known allergies. Social History:     Tobacco:    reports that he has never smoked. He has never used smokeless tobacco.  Alcohol:      reports no history of alcohol use. Drug Use:  reports no history of drug use. Family History:     Family History   Problem Relation Age of Onset    Diabetes Mother     Diabetes Father          3/2020 sepsis    Liver Disease Father     Heart Disease Brother         also smoker    Heart Surgery Brother     Heart Attack Brother         x's 2    Heart Attack Paternal Uncle         11 of the brothers with heart disease    Heart Disease Paternal Uncle        Review of Systems:     Positive and Negative as described in HPI.     Review of Systems Constitutional: Positive for chills. Negative for diaphoresis and fever. HENT: Negative for congestion and hearing loss. Respiratory: Positive for shortness of breath. Negative for cough, wheezing and stridor. Cardiovascular: Positive for chest pain. Negative for palpitations and leg swelling. Gastrointestinal: Positive for nausea. Negative for abdominal pain, blood in stool, constipation, diarrhea and vomiting. Genitourinary: Negative for dysuria and frequency. Musculoskeletal: Negative for myalgias. Skin: Negative for rash. Neurological: Negative for dizziness, seizures and headaches. Psychiatric/Behavioral: The patient is not nervous/anxious. Physical Exam:   /69   Pulse 80   Temp 97.7 °F (36.5 °C) (Oral)   Resp 18   Ht 6' 4\" (1.93 m)   Wt 290 lb (131.5 kg)   SpO2 99%   BMI 35.30 kg/m²   Temp (24hrs), Av.1 °F (37.3 °C), Min:97.7 °F (36.5 °C), Max:100.7 °F (38.2 °C)    No results for input(s): POCGLU in the last 72 hours. No intake or output data in the 24 hours ending 20 1236    Physical Exam  Vitals signs and nursing note reviewed. Constitutional:       Appearance: Normal appearance. HENT:      Mouth/Throat:      Mouth: Mucous membranes are moist.   Eyes:      Extraocular Movements: Extraocular movements intact. Cardiovascular:      Rate and Rhythm: Normal rate and regular rhythm. Pulses: Normal pulses. Heart sounds: Normal heart sounds. Pulmonary:      Effort: Pulmonary effort is normal.      Breath sounds: Normal breath sounds. Abdominal:      General: Bowel sounds are normal.      Palpations: Abdomen is soft. Musculoskeletal: Normal range of motion. Right lower leg: No edema. Left lower leg: No edema. Skin:     General: Skin is warm and dry. Capillary Refill: Capillary refill takes less than 2 seconds. Neurological:      General: No focal deficit present.       Mental Status: He is alert and oriented to person, place, and time.          Investigations:      Laboratory Testing:  Recent Results (from the past 24 hour(s))   CBC Auto Differential    Collection Time: 12/26/20  9:39 PM   Result Value Ref Range    WBC 9.4 3.5 - 11.3 k/uL    RBC 4.73 4.21 - 5.77 m/uL    Hemoglobin 14.5 13.0 - 17.0 g/dL    Hematocrit 42.5 40.7 - 50.3 %    MCV 89.9 82.6 - 102.9 fL    MCH 30.7 25.2 - 33.5 pg    MCHC 34.1 28.4 - 34.8 g/dL    RDW 12.5 11.8 - 14.4 %    Platelets 136 (L) 831 - 453 k/uL    MPV 9.7 8.1 - 13.5 fL    NRBC Automated 0.0 0.0 per 100 WBC    Differential Type NOT REPORTED     Seg Neutrophils 83 (H) 36 - 65 %    Lymphocytes 10 (L) 24 - 43 %    Monocytes 5 3 - 12 %    Eosinophils % 2 1 - 4 %    Basophils 0 0 - 2 %    Immature Granulocytes 0 0 %    Segs Absolute 7.73 1.50 - 8.10 k/uL    Absolute Lymph # 0.90 (L) 1.10 - 3.70 k/uL    Absolute Mono # 0.51 0.10 - 1.20 k/uL    Absolute Eos # 0.20 0.00 - 0.44 k/uL    Basophils Absolute 0.04 0.00 - 0.20 k/uL    Absolute Immature Granulocyte 0.03 0.00 - 0.30 k/uL    WBC Morphology NOT REPORTED     RBC Morphology NOT REPORTED     Platelet Estimate NOT REPORTED    Basic Metabolic Panel    Collection Time: 12/26/20  9:39 PM   Result Value Ref Range    Glucose 136 (H) 70 - 99 mg/dL    BUN 14 6 - 20 mg/dL    CREATININE 0.89 0.70 - 1.20 mg/dL    Bun/Cre Ratio 16 9 - 20    Calcium 9.2 8.6 - 10.4 mg/dL    Sodium 137 135 - 144 mmol/L    Potassium 3.9 3.7 - 5.3 mmol/L    Chloride 102 98 - 107 mmol/L    CO2 23 20 - 31 mmol/L    Anion Gap 12 9 - 17 mmol/L    GFR Non-African American >60 >60 mL/min    GFR African American >60 >60 mL/min    GFR Comment          GFR Staging NOT REPORTED    Trop/Myoglobin    Collection Time: 12/26/20  9:39 PM   Result Value Ref Range    Troponin, High Sensitivity <6 0 - 22 ng/L    Troponin T NOT REPORTED <0.03 ng/mL    Troponin Interp NOT REPORTED     Myoglobin 42 28 - 72 ng/mL   D-Dimer, Quantitative    Collection Time: 12/26/20  9:39 PM   Result Value Ref Range    D-Dimer, Quant 0.34 0.00 - 0.59 mg/L FEU   COVID-19    Collection Time: 12/26/20 10:00 PM    Specimen: Other   Result Value Ref Range    SARS-CoV-2          SARS-CoV-2, Rapid Not Detected Not Detected    Source . NASOPHARYNGEAL SWAB     SARS-CoV-2         Troponin    Collection Time: 12/27/20 12:34 AM   Result Value Ref Range    Troponin, High Sensitivity <6 0 - 22 ng/L    Troponin T NOT REPORTED <0.03 ng/mL    Troponin Interp NOT REPORTED    Urinalysis Reflex to Culture    Collection Time: 12/27/20  2:25 AM    Specimen: Urine, clean catch   Result Value Ref Range    Color, UA DARK YELLOW (A) YELLOW    Turbidity UA CLEAR CLEAR    Glucose, Ur NEGATIVE NEGATIVE    Bilirubin Urine NEGATIVE NEGATIVE    Ketones, Urine TRACE (A) NEGATIVE    Specific Gravity, UA 1.025 1.005 - 1.030    Urine Hgb NEGATIVE NEGATIVE    pH, UA 6.0 5.0 - 8.0    Protein, UA NEGATIVE NEGATIVE    Urobilinogen, Urine Normal Normal    Nitrite, Urine NEGATIVE NEGATIVE    Leukocyte Esterase, Urine NEGATIVE NEGATIVE    Urinalysis Comments NOT REPORTED    Microscopic Urinalysis    Collection Time: 12/27/20  2:25 AM   Result Value Ref Range    -          WBC, UA 0 TO 2 0 - 5 /HPF    RBC, UA 0 TO 2 0 - 2 /HPF    Casts UA NOT REPORTED /LPF    Crystals, UA NOT REPORTED None /HPF    Epithelial Cells UA 0 TO 2 0 - 5 /HPF    Renal Epithelial, UA NOT REPORTED 0 /HPF    Bacteria, UA NOT REPORTED None    Mucus, UA 2+ (A) None    Trichomonas, UA NOT REPORTED None    Amorphous, UA NOT REPORTED None    Other Observations UA NOT REPORTED NOT REQ.     Yeast, UA NOT REPORTED None   Troponin    Collection Time: 12/27/20  4:24 AM   Result Value Ref Range    Troponin, High Sensitivity <6 0 - 22 ng/L    Troponin T NOT REPORTED <0.03 ng/mL    Troponin Interp NOT REPORTED    CBC    Collection Time: 12/27/20  4:24 AM   Result Value Ref Range    WBC 7.8 3.5 - 11.3 k/uL    RBC 4.38 4.21 - 5.77 m/uL    Hemoglobin 13.6 13.0 - 17.0 g/dL    Hematocrit 40.8 40.7 - 50.3 %    MCV 93.2 82.6 - Not Detected    Coronavirus NL63 PCR Not Detected Not Detected    Coronavirus OC43 PCR Not Detected Not Detected    SARS-CoV-2, PCR Not Detected Not Detected    Human Metapneumovirus PCR Not Detected Not Detected    Rhino/Enterovirus PCR Not Detected Not Detected    Influenza A by PCR Not Detected Not Detected    Influenza A H1 PCR NOT REPORTED Not Detected    Influenza A H1 (2009) PCR NOT REPORTED Not Detected    Influenza A H3 PCR NOT REPORTED Not Detected    Influenza B by PCR Not Detected Not Detected    Parainfluenza 1 PCR Not Detected Not Detected    Parainfluenza 2 PCR Not Detected Not Detected    Parainfluenza 3 PCR Not Detected Not Detected    Parainfluenza 4 PCR Not Detected Not Detected    Resp Syncytial Virus PCR Not Detected Not Detected    Bordetella Parapertussis Not Detected Not Detected    B Pertussis by PCR Not Detected Not Detected    Chlamydia pneumoniae By PCR Not Detected Not Detected    Mycoplasma pneumo by PCR Not Detected Not Detected       Imaging/Diagnostics:  Xr Chest Portable    Result Date: 12/26/2020  No acute abnormality.        Assessment :      Hospital Problems           Last Modified POA    * (Principal) Chest pain 12/27/2020 Yes    GERD (gastroesophageal reflux disease) (Chronic) 12/27/2020 Yes    Overview Signed 2/24/2020  7:52 AM by Lavon Farmer PA-C     Last Assessment & Plan:   Home medication Omeprazole    PLAN  Hold omeprazole due to interaction with clopidogrel  Change to pantoprazole  May have Maalox for heartburn          Non-ST elevation myocardial infarction (NSTEMI) ([facility] [address] (Chronic) 12/27/2020 Yes    Overview Signed 2/24/2020  7:52 AM by Lavon Farmer PA-C     NonSTEMI (TnI 0.37 ) - CATH (3/13/2018) 70% LAD, 30% Diag 80% OM1,  = PCI/KRISS of LAD with Diagonal branch occlusion and rescue Diag PCI/KRISS (Post PCI Rina with TnI 44)     Exercise/Reg MPI (1/24/2019) - Exercised to to 145 bpm (86% PMHR) - LVEF 57%; 1-mm upsloping ST depression/ No infarct or ischemia on perfusion imaging  CATH (11/15/2019) - patent LAD stent (20% stenosis), 20% Diag1, 671% OM1, 30% RCA = Significant ostial OM1 stenosis unchanged from 3/2018 = Medical management advised. Last Assessment & Plan:   Troponin: 0.04, 0.09, 0.37, 0.62. Peak 20.53  Classic Features, family history  EKG normal at presentation, progressed to ST depression II,III aVF, no TWI, no CORBIN  JUDY Score: 2 (8% risk at 14 days of: all-cause mortality, new or recurrent MI, or severe recurrent ischemia requiring urgent revascularization.)  HEART score: 5 (Risk of MACE of 12-16.6%.)  Prognostic factors: EF normal. Vessels involved and severity TBD by cath today  Lipids LDL 74, HDL 27  A1c 5.9  SP 2 KRISS to LAD and Diag on 3/12/18    PLAN  Aspirin 325   Clopidogrel 600 mg load, clopidogrel 75  Metoprolol 25 BID  Eptifibatide given 2 boluses + 12 hours continuous  Nitrates for recurrent chest pain  Oxygen if hypoxemic   Atorvastatin 80  Discontinue omeprazole due to interaction with plavix  Admit to telemetry         Coronary artery disease involving native heart without angina pectoris (Chronic) 12/27/2020 Yes    Obstructive sleep apnea syndrome (Chronic) 12/27/2020 Yes    Overview Signed 12/27/2020 12:11 PM by AURELIANO Hampton CNP     Didn't quality for a machine               Plan:     Patient status observation in the  Med/Surge    Chest pain; cardiology consulted. stress test in a.m., n.p.o. at midnight. Continue home aspirin, Lipitor, Plavix, beta-blocker and PPI. Nitro as needed. antiemetics as needed.      Monitor vitals and tele    DVT prophylaxis    Monitor and replace lytes as needed    Lipids and a1c pending    Repeat cbc and bmp in am    Respiratory PCR negative        Consultations:   [facility] CONSULT TO [address] [place] [address], APRN - CNP  12/27/2020  12:36 PM    Copy sent to Dr. Estrellita Rangel PA-C

## 2020-12-27 NOTE — ED PROVIDER NOTES
Take 1 tablet by mouth daily    SILDENAFIL (REVATIO) 20 MG TABLET    Take 2-3 tabs as needed prior to sexual activity    SM ASPIRIN ADULT LOW STRENGTH 81 MG EC TABLET    TAKE 1 TABLET BY MOUTH ONE TIME A DAY        PAST MEDICAL HISTORY         Diagnosis Date    Allergic rhinitis     Coronary artery disease involving native heart without angina pectoris 11/6/2018    GERD (gastroesophageal reflux disease)     Heart attack ([facility] [address] 03/12/2018    Patient had 3 stents placed    NSTEMI (non-ST elevated myocardial infarction) ([facility] [address] 3/23/2018       SURGICAL HISTORY           Procedure Laterality Date    CORONARY ANGIOPLASTY WITH STENT PLACEMENT  03/12/2018    3 stents xience alpine 4.0 x 18 mm LAD    KNEE SURGERY Left     at 26 yo         FAMILY HISTORY           Problem Relation Age of Onset    Diabetes Mother     Diabetes Father     Heart Disease Brother         also smoker    Heart Attack Paternal Uncle         11 of the brothers with heart disease    Heart Disease Paternal Uncle      Family Status   Relation Name Status    Mother  Alive    Father  Alive    Brother  Alive    [facility]  (Not Specified)        SOCIAL HISTORY      reports that he has never smoked. He has never used smokeless tobacco. He reports that he does not drink alcohol or use drugs. REVIEW OF SYSTEMS    (2-9 systems for level 4, 10 or more for level 5)     Review of Systems   Constitutional: Negative for chills, fever and unexpected weight change. HENT: Negative for congestion, rhinorrhea, sinus pressure and sore throat. Respiratory: Negative for cough, shortness of breath and wheezing. Cardiovascular: Positive for chest pain. Negative for palpitations. Gastrointestinal: Negative for abdominal pain, constipation, diarrhea, nausea and vomiting. Genitourinary: Negative for dysuria and hematuria. Musculoskeletal: Negative for arthralgias and myalgias. Skin: Negative for color change and rash.    Neurological: Negative for dizziness, weakness and headaches. Hematological: Negative for adenopathy. All other systems reviewed and are negative. Except as noted above the remainder of the review of systems was reviewed and negative. PHYSICAL EXAM    (up to 7 for level 4, 8 or more for level 5)     ED Triage Vitals   BP Temp Temp Source Pulse Resp SpO2 Height Weight   12/26/20 2135 12/26/20 2138 12/26/20 2138 12/26/20 2135 12/26/20 2135 12/26/20 2135 12/26/20 2133 12/26/20 2133   133/77 100.7 °F (38.2 °C) Oral 114 27 98 % 6' 4\" (1.93 m) 290 lb (131.5 kg)       Physical Exam  Vitals signs reviewed. Constitutional:       Appearance: He is well-developed. HENT:      Head: Normocephalic and atraumatic. Eyes:      Conjunctiva/sclera: Conjunctivae normal.      Pupils: Pupils are equal, round, and reactive to light. Neck:      Musculoskeletal: Normal range of motion and neck supple. Cardiovascular:      Rate and Rhythm: Normal rate and regular rhythm. Pulmonary:      Effort: Pulmonary effort is normal. No respiratory distress. Breath sounds: Normal breath sounds. No stridor. Abdominal:      General: Bowel sounds are normal.      Palpations: Abdomen is soft. Musculoskeletal: Normal range of motion. Lymphadenopathy:      Cervical: No cervical adenopathy. Skin:     General: Skin is warm and dry. Findings: No rash. Neurological:      Mental Status: He is alert and oriented to person, place, and time. DIAGNOSTIC RESULTS     EKG: All EKG's are interpreted by the Emergency Department Physician who either signs or Co-signs this chart in the absence of a cardiologist.    Sinus tachycardia.  No st elevation    RADIOLOGY:   Non-plain film images such as CT, Ultrasound and MRI are read by the radiologist. Plain radiographic images are visualized and preliminarily interpreted by the emergency physician with the below findings:    Xr Chest Portable    Result Date: 12/26/2020  EXAMINATION: ONE XRAY VIEW OF THE CHEST 12/26/2020 8:55 pm COMPARISON: None. HISTORY: ORDERING SYSTEM PROVIDED HISTORY: Chest Pain TECHNOLOGIST PROVIDED HISTORY: Chest Pain Reason for Exam: chest pain Acuity: Unknown Type of Exam: Unknown Relevant Medical/Surgical History: chest pain and sob x today, no other chest complaints FINDINGS: Coronary stent is present. No lung infiltrate or consolidation. No pneumothorax or pleural effusion. Heart size is normal.     No acute abnormality. Interpretation per the Radiologist below, if available at the time of this note:    XR CHEST PORTABLE   Final Result   No acute abnormality. LABS:  Labs Reviewed   CBC WITH AUTO DIFFERENTIAL - Abnormal; Notable for the following components:       Result Value    Platelets 143 (*)     Seg Neutrophils 83 (*)     Lymphocytes 10 (*)     Absolute Lymph # 0.90 (*)     All other components within normal limits   BASIC METABOLIC PANEL - Abnormal; Notable for the following components:    Glucose 136 (*)     All other components within normal limits   TROP/MYOGLOBIN   COVID-19   D-DIMER, QUANTITATIVE       All other labs were within normal range or not returned as of this dictation. EMERGENCY DEPARTMENT COURSE and DIFFERENTIAL DIAGNOSIS/MDM:   Vitals:    Vitals:    12/26/20 2150 12/26/20 2203 12/26/20 2205 12/26/20 2220   BP: (!) 111/57  105/71 124/81   Pulse: 108 105 102 101   Resp: 23 21 25 21   Temp:       TempSrc:       SpO2: 95% 97% 95% 96%   Weight:       Height:           Medical Decision Making: pt has a heart score of 5. His first set of cardiac enzymes were negative but with his heart score and heart history, he will be admitted. Case was discussed with internal medicine    CONSULTS:  IP CONSULT TO INTERNAL MEDICINE        FINAL IMPRESSION      1. Chest pain, unspecified type          DISPOSITION/PLAN   DISPOSITION Decision To Admit 12/26/2020 10:53:40 PM      PATIENT REFERRED TO:   No follow-up provider specified.     DISCHARGE MEDICATIONS: New Prescriptions    No medications on file           (Please note that portions of this note were completed with a voice recognition program.  Efforts were made to edit the dictations but occasionally words are mis-transcribed.)    Juju Acevedo NP, APRN - [place]  Certified Nurse Practitioner          AURELIANO Thompson - CNP  12/26/20 4752

## 2020-12-28 ENCOUNTER — APPOINTMENT (OUTPATIENT)
Dept: NUCLEAR MEDICINE | Age: 55
End: 2020-12-28
Payer: COMMERCIAL

## 2020-12-28 VITALS
RESPIRATION RATE: 20 BRPM | BODY MASS INDEX: 35.9 KG/M2 | WEIGHT: 294.8 LBS | HEIGHT: 76 IN | DIASTOLIC BLOOD PRESSURE: 75 MMHG | TEMPERATURE: 98.2 F | OXYGEN SATURATION: 97 % | HEART RATE: 81 BPM | SYSTOLIC BLOOD PRESSURE: 117 MMHG

## 2020-12-28 LAB
ANION GAP SERPL CALCULATED.3IONS-SCNC: 7 MMOL/L (ref 9–17)
BUN BLDV-MCNC: 13 MG/DL (ref 6–20)
BUN/CREAT BLD: 17 (ref 9–20)
CALCIUM SERPL-MCNC: 9 MG/DL (ref 8.6–10.4)
CHLORIDE BLD-SCNC: 104 MMOL/L (ref 98–107)
CO2: 27 MMOL/L (ref 20–31)
CREAT SERPL-MCNC: 0.75 MG/DL (ref 0.7–1.2)
GFR AFRICAN AMERICAN: >60 ML/MIN
GFR NON-AFRICAN AMERICAN: >60 ML/MIN
GFR SERPL CREATININE-BSD FRML MDRD: ABNORMAL ML/MIN/{1.73_M2}
GFR SERPL CREATININE-BSD FRML MDRD: ABNORMAL ML/MIN/{1.73_M2}
GLUCOSE BLD-MCNC: 128 MG/DL (ref 70–99)
HCT VFR BLD CALC: 42.5 % (ref 40.7–50.3)
HEMOGLOBIN: 14.1 G/DL (ref 13–17)
LV EF: 60 %
LVEF MODALITY: NORMAL
MCH RBC QN AUTO: 30.5 PG (ref 25.2–33.5)
MCHC RBC AUTO-ENTMCNC: 33.2 G/DL (ref 28.4–34.8)
MCV RBC AUTO: 91.8 FL (ref 82.6–102.9)
NRBC AUTOMATED: 0 PER 100 WBC
PDW BLD-RTO: 12.4 % (ref 11.8–14.4)
PLATELET # BLD: 114 K/UL (ref 138–453)
PMV BLD AUTO: 9.9 FL (ref 8.1–13.5)
POTASSIUM SERPL-SCNC: 4 MMOL/L (ref 3.7–5.3)
RBC # BLD: 4.63 M/UL (ref 4.21–5.77)
SODIUM BLD-SCNC: 138 MMOL/L (ref 135–144)
WBC # BLD: 6.3 K/UL (ref 3.5–11.3)

## 2020-12-28 PROCEDURE — 6370000000 HC RX 637 (ALT 250 FOR IP): Performed by: NURSE PRACTITIONER

## 2020-12-28 PROCEDURE — 78452 HT MUSCLE IMAGE SPECT MULT: CPT

## 2020-12-28 PROCEDURE — 3430000000 HC RX DIAGNOSTIC RADIOPHARMACEUTICAL: Performed by: NURSE PRACTITIONER

## 2020-12-28 PROCEDURE — G0378 HOSPITAL OBSERVATION PER HR: HCPCS

## 2020-12-28 PROCEDURE — 36415 COLL VENOUS BLD VENIPUNCTURE: CPT

## 2020-12-28 PROCEDURE — 2580000003 HC RX 258: Performed by: NURSE PRACTITIONER

## 2020-12-28 PROCEDURE — 80048 BASIC METABOLIC PNL TOTAL CA: CPT

## 2020-12-28 PROCEDURE — 99219 PR INITIAL OBSERVATION CARE/DAY 50 MINUTES: CPT | Performed by: NURSE PRACTITIONER

## 2020-12-28 PROCEDURE — 93017 CV STRESS TEST TRACING ONLY: CPT

## 2020-12-28 PROCEDURE — 85027 COMPLETE CBC AUTOMATED: CPT

## 2020-12-28 PROCEDURE — A9500 TC99M SESTAMIBI: HCPCS | Performed by: NURSE PRACTITIONER

## 2020-12-28 PROCEDURE — 6360000002 HC RX W HCPCS: Performed by: NURSE PRACTITIONER

## 2020-12-28 RX ORDER — AMINOPHYLLINE DIHYDRATE 25 MG/ML
50 INJECTION, SOLUTION INTRAVENOUS PRN
Status: ACTIVE | OUTPATIENT
Start: 2020-12-28 | End: 2020-12-28

## 2020-12-28 RX ORDER — SODIUM CHLORIDE 9 MG/ML
500 INJECTION, SOLUTION INTRAVENOUS CONTINUOUS PRN
Status: ACTIVE | OUTPATIENT
Start: 2020-12-28 | End: 2020-12-28

## 2020-12-28 RX ORDER — SODIUM CHLORIDE 0.9 % (FLUSH) 0.9 %
10 SYRINGE (ML) INJECTION PRN
Status: ACTIVE | OUTPATIENT
Start: 2020-12-28 | End: 2020-12-28

## 2020-12-28 RX ORDER — METOPROLOL SUCCINATE 100 MG/1
100 TABLET, EXTENDED RELEASE ORAL DAILY
Qty: 30 TABLET | Refills: 3 | Status: SHIPPED | OUTPATIENT
Start: 2020-12-28

## 2020-12-28 RX ORDER — NITROGLYCERIN 0.4 MG/1
0.4 TABLET SUBLINGUAL EVERY 5 MIN PRN
Status: ACTIVE | OUTPATIENT
Start: 2020-12-28 | End: 2020-12-28

## 2020-12-28 RX ORDER — ATROPINE SULFATE 0.1 MG/ML
0.5 INJECTION INTRAVENOUS EVERY 5 MIN PRN
Status: ACTIVE | OUTPATIENT
Start: 2020-12-28 | End: 2020-12-28

## 2020-12-28 RX ORDER — METOPROLOL TARTRATE 5 MG/5ML
5 INJECTION INTRAVENOUS EVERY 5 MIN PRN
Status: ACTIVE | OUTPATIENT
Start: 2020-12-28 | End: 2020-12-28

## 2020-12-28 RX ADMIN — REGADENOSON 0.4 MG: 0.08 INJECTION, SOLUTION INTRAVENOUS at 09:31

## 2020-12-28 RX ADMIN — Medication 10 ML: at 07:36

## 2020-12-28 RX ADMIN — ASPIRIN 81 MG: 81 TABLET, COATED ORAL at 13:05

## 2020-12-28 RX ADMIN — Medication 10 ML: at 09:31

## 2020-12-28 RX ADMIN — CLOPIDOGREL BISULFATE 75 MG: 75 TABLET ORAL at 13:06

## 2020-12-28 RX ADMIN — METOPROLOL SUCCINATE 100 MG: 50 TABLET, EXTENDED RELEASE ORAL at 13:05

## 2020-12-28 RX ADMIN — CETIRIZINE HYDROCHLORIDE 10 MG: 10 TABLET, FILM COATED ORAL at 13:06

## 2020-12-28 RX ADMIN — TETRAKIS(2-METHOXYISOBUTYLISOCYANIDE)COPPER(I) TETRAFLUOROBORATE 38.1 MILLICURIE: 1 INJECTION, POWDER, LYOPHILIZED, FOR SOLUTION INTRAVENOUS at 14:00

## 2020-12-28 RX ADMIN — ATORVASTATIN CALCIUM 80 MG: 80 TABLET, FILM COATED ORAL at 13:05

## 2020-12-28 RX ADMIN — TETRAKIS(2-METHOXYISOBUTYLISOCYANIDE)COPPER(I) TETRAFLUOROBORATE 22 MILLICURIE: 1 INJECTION, POWDER, LYOPHILIZED, FOR SOLUTION INTRAVENOUS at 09:30

## 2020-12-28 ASSESSMENT — ENCOUNTER SYMPTOMS
ALLERGIC/IMMUNOLOGIC NEGATIVE: 1
RESPIRATORY NEGATIVE: 1
EYES NEGATIVE: 1
GASTROINTESTINAL NEGATIVE: 1

## 2020-12-28 NOTE — PLAN OF CARE
Problem: Pain:  Goal: Pain level will decrease  Description: Pain level will decrease  Outcome: Ongoing  Goal: Control of acute pain  Description: Control of acute pain  Outcome: Ongoing  Goal: Control of chronic pain  Description: Control of chronic pain  Outcome: Ongoing     Problem: Safety:  Goal: Free from accidental physical injury  Description: Free from accidental physical injury  Outcome: Ongoing  Goal: Free from intentional harm  Description: Free from intentional harm  Outcome: Ongoing     Problem: Daily Care:  Goal: Daily care needs are met  Description: Daily care needs are met  Outcome: Ongoing     Problem: Discharge Planning:  Goal: Patients continuum of care needs are met  Description: Patients continuum of care needs are met  Outcome: Ongoing

## 2020-12-28 NOTE — PROGRESS NOTES
Pt discharged to home in stable condition with belongings  Discharge instructions given  Pt denies having any further questions at this time  Patient/family state they have everything they were admitted with.

## 2020-12-28 NOTE — PROGRESS NOTES
[facility]  Office: [phone]  Jacqueline Gray, DO, Cubaro Wang, DO, Joseline Angelo, DO, Paul Urrutia Blood, DO, Emily Rivera MD, Wu Pritchett MD, Saurabh Persaud MD, Kb Love MD, Sandra Patel MD, Kim Candelario MD, Gisele Dalton MD, Chasity Jackman MD, Jhon Jacob MD, Santos Escobar DO, Teresa Domínguez MD, Ness Rivera MD, Denver Gates, DO, Sushil Acevedo MD,  Mini Faria, DO, Josephine Haile MD, Sergey Vera MD, Rohit Goodson, [facility], [address], [facility], Tayler Sabillonr, CNP, Juan A Putnam, CNS, Vanna Cooley, CNP, Jayson Bañuelos, CNP, Blanca Mares, CNP, Fidel Saldana, CNP, Saeed Salmeron, CNP, Wolfgang Santiago PA-C, Price Ferrer, [facility], Renan Duran, CNP, Denise Gonzalez, CNP, Rob Ho, CNP, Ashley Medeiros, CNP, Francisca Mutton, [address] [place] [address]    Progress Note    12/28/2020    7:36 AM    Name:   Ricky Raymond  MRN:     [id number]     Maria Elenabelindalyside:      [de-identified]   Room:   2023/2023-01   Day:  0  Admit Date:  12/26/2020  9:30 PM    PCP:   Estrellita Rangel PA-C  Code Status:  Full Code    Subjective:     C/C:   Chief Complaint   Patient presents with    Chest Pain     x3 stents placed in 3/2018, mid-sternal chest pain started x1 hour ago, radiates straight into back    Shortness of Breath    Nausea     Interval History Status: improved. Patient denies any chest pain or shortness of breath today. He states that he feels great and feels he is able to go home today. Stress test was today, patient tolerated with no issues. Brief History:     Per my colleague:  Patient presented to the emergency department with complaints of chest pain that started after an episode of severe chills that all started about an hour before arrival to the ER. He reports having severe chills that led into midsternal chest pain that he can feel through his shoulder blades with associated nausea.   He denies shortness of breath or diaphoresis during this episode. He states he took a nitroglycerin prior to leaving to come to the hospital and has had mild improvement in the chest pain and chills. He stated his pain continued when he initially got to the ER but after dose of morphine he felt better. Patient states he has a history of an NSTEMI in March 2018 and had 3 stents placed. Per care everywhere he had a second cardiac cath in November 2019 that showed his stents were patent but he had a 71% ostial first obtuse marginal stenosis that was treated medically. He states he had that catheterization because he has had intermittent/ongoing shortness of breath since his initial heart attack in 2018. He states he was just seen by his cardiologist at [facility] a few days ago and his metoprolol XL dose was increased from 50 mg daily to 100. On arrival to the ER temp is 100.7, respiratory rate was 22 pulse was 115 blood pressure was normal.  Sat was 98% on room air. Chest x-ray shows no acute abnormality. Troponin less than 6 x's 2. His initial EKG read (poor quality) sinus tachycardia with possible Left atrial enlargement. Second EKG Normal sinus rhythm. Review of Systems:     Review of Systems   Constitutional: Negative. HENT: Negative. Eyes: Negative. Respiratory: Negative. Cardiovascular: Negative. Gastrointestinal: Negative. Endocrine: Negative. Genitourinary: Negative. Musculoskeletal: Negative. Skin: Negative. Allergic/Immunologic: Negative. Neurological: Negative. Hematological: Negative. Psychiatric/Behavioral: Negative. Medications:      Allergies:  No Known Allergies    Current Meds:   Scheduled Meds:    atorvastatin  80 mg Oral Daily    clopidogrel  75 mg Oral Daily    cetirizine  10 mg Oral Daily    metoprolol succinate  100 mg Oral Daily    pantoprazole  40 mg Oral Daily    aspirin  81 mg Oral Daily    sodium chloride flush  10 mL Intravenous 2 times per day    enoxaparin  30 mg Subcutaneous BID     Continuous Infusions:   PRN Meds: sodium chloride flush, promethazine **OR** ondansetron, acetaminophen **OR** acetaminophen, magnesium hydroxide, potassium chloride **OR** potassium alternative oral replacement **OR** potassium chloride, magnesium sulfate, nitroGLYCERIN    Data:     Past Medical History:   has a past medical history of Allergic rhinitis, Coronary artery disease involving native heart without angina pectoris, GERD (gastroesophageal reflux disease), Heart attack ([facility] [address] and NSTEMI (non-ST elevated myocardial infarction) ([facility] [address] Social History:   reports that he has never smoked. He has never used smokeless tobacco. He reports that he does not drink alcohol or use drugs. Family History:   Family History   Problem Relation Age of Onset    Diabetes Mother     Diabetes Father          3/2020 sepsis    Liver Disease Father     Heart Disease Brother         also smoker    Heart Surgery Brother     Heart Attack Brother         x's 2    Heart Attack Paternal Uncle         5 of the brothers with heart disease    Heart Disease Paternal Uncle        Vitals:  /68   Pulse 79   Temp 98.2 °F (36.8 °C) (Oral)   Resp 16   Ht 6' 4\" (1.93 m)   Wt 294 lb 12.8 oz (133.7 kg)   SpO2 97%   BMI 35.88 kg/m²   Temp (24hrs), Av.8 °F (37.1 °C), Min:98.2 °F (36.8 °C), Max:99.5 °F (37.5 °C)    No results for input(s): POCGLU in the last 72 hours. I/O (24Hr):   No intake or output data in the 24 hours ending 20 0736    Labs:  Hematology:  Recent Labs     20  2139 20  0424 20  0616   WBC 9.4 7.8 6.3   RBC 4.73 4.38 4.63   HGB 14.5 13.6 14.1   HCT 42.5 40.8 42.5   MCV 89.9 93.2 91.8   MCH 30.7 31.1 30.5   MCHC 34.1 33.3 33.2   RDW 12.5 12.6 12.4   * 137* 114*   MPV 9.7 9.9 9.9   DDIMER 0.34  --   --      Chemistry:  Recent Labs     20  2139 20  0034 20  0424 20  0616     --  135 138   K 3.9  --  4.0 4.0     -- 102 104   CO2 23  --  23 27   GLUCOSE 136*  --  126* 128*   BUN 14  --  15 13   CREATININE 0.89  --  0.78 0.75   MG  --   --  1.8  --    ANIONGAP 12  --  10 7*   LABGLOM >60  --  >60 >60   GFRAA >60  --  >60 >60   CALCIUM 9.2  --  8.9 9.0   TROPHS <6 <6 <6  --    MYOGLOBIN 42  --   --   --      Recent Labs     12/27/20  0034 12/27/20  0424   PROT  --  6.7   LABALBU  --  3.6   LABA1C 5.5  --    AST  --  22   ALT  --  37   ALKPHOS  --  44   BILITOT  --  0.69   CHOL  --  109   HDL  --  26*   LDLCHOLESTEROL  --  53   CHOLHDLRATIO  --  4.2   TRIG  --  151*   VLDL  --  NOT REPORTED     ABG:No results found for: POCPH, PHART, PH, POCPCO2, TBB3WLC, PCO2, POCPO2, PO2ART, PO2, POCHCO3, TKV3JDX, HCO3, NBEA, PBEA, BEART, BE, THGBART, THB, VEM9SAO, XJTH6ZGX, W1EOVUTE, O2SAT, FIO2  Lab Results   Component Value Date/Time    SPECIAL NOT REPORTED 08/27/2019 08:29 AM    SPECIAL NOT REPORTED 08/27/2019 08:29 AM    SPECIAL NOT REPORTED 08/27/2019 08:29 AM     No results found for: CULTURE    Radiology:  Xr Chest Portable    Result Date: 12/26/2020  No acute abnormality. Physical Examination:        Physical Exam  Vitals signs and nursing note reviewed. Constitutional:       General: He is not in acute distress. Appearance: Normal appearance. He is obese. He is not ill-appearing, toxic-appearing or diaphoretic. HENT:      Head: Normocephalic and atraumatic. Right Ear: External ear normal.      Left Ear: External ear normal.      Nose: Nose normal. No congestion or rhinorrhea. Mouth/Throat:      Mouth: Mucous membranes are moist.   Eyes:      General: No scleral icterus. Right eye: No discharge. Left eye: No discharge. Extraocular Movements: Extraocular movements intact. Conjunctiva/sclera: Conjunctivae normal.      Pupils: Pupils are equal, round, and reactive to light. Neck:      Musculoskeletal: Normal range of motion and neck supple. No neck rigidity or muscular tenderness. Vascular: No carotid bruit. Cardiovascular:      Rate and Rhythm: Normal rate and regular rhythm. Pulses: Normal pulses. Heart sounds: Normal heart sounds. No murmur. No friction rub. No gallop. Pulmonary:      Effort: Pulmonary effort is normal. No respiratory distress. Breath sounds: Normal breath sounds. No stridor. No wheezing, rhonchi or rales. Chest:      Chest wall: No tenderness. Abdominal:      General: Bowel sounds are normal. There is no distension. Palpations: Abdomen is soft. There is no mass. Tenderness: There is no abdominal tenderness. There is no guarding or rebound. Hernia: No hernia is present. Musculoskeletal:         General: No swelling, tenderness, deformity or signs of injury. Right lower leg: No edema. Left lower leg: No edema. Lymphadenopathy:      Cervical: No cervical adenopathy. Skin:     Capillary Refill: Capillary refill takes less than 2 seconds. Coloration: Skin is not jaundiced or pale. Findings: No bruising, erythema, lesion or rash. Neurological:      General: No focal deficit present. Mental Status: He is alert and oriented to person, place, and time. Sensory: No sensory deficit. Motor: No weakness.       Coordination: Coordination normal.   Psychiatric:         Mood and Affect: Mood normal.         Behavior: Behavior normal.         Assessment:        Hospital Problems           Last Modified POA    * (Principal) Chest pain 12/27/2020 Yes    GERD (gastroesophageal reflux disease) (Chronic) 12/27/2020 Yes    Overview Signed 2/24/2020  7:52 AM by Harleen Queen PA-C     Last Assessment & Plan:   Home medication Omeprazole    PLAN  Hold omeprazole due to interaction with clopidogrel  Change to pantoprazole  May have Maalox for heartburn          Non-ST elevation myocardial infarction (NSTEMI) ([facility] [address] (Chronic) 12/27/2020 Yes    Overview Signed 2/24/2020  7:52 AM by Harleen Queen PA-C     NonSTEMI (TnI 0.37 ) - CATH (3/13/2018) 70% LAD, 30% Diag 80% OM1,  = PCI/KRISS of LAD with Diagonal branch occlusion and rescue Diag PCI/KRISS (Post PCI Rina with TnI 44)     Exercise/Reg MPI (1/24/2019) - Exercised to to 145 bpm (86% PMHR) - LVEF 57%; 1-mm upsloping ST depression/ No infarct or ischemia on perfusion imaging  CATH (11/15/2019) - patent LAD stent (20% stenosis), 20% Diag1, 671% OM1, 30% RCA = Significant ostial OM1 stenosis unchanged from 3/2018 = Medical management advised. Last Assessment & Plan:   Troponin: 0.04, 0.09, 0.37, 0.62. Peak 20.53  Classic Features, family history  EKG normal at presentation, progressed to ST depression II,III aVF, no TWI, no CORBIN  JUDY Score: 2 (8% risk at 14 days of: all-cause mortality, new or recurrent MI, or severe recurrent ischemia requiring urgent revascularization.)  HEART score: 5 (Risk of MACE of 12-16.6%.)  Prognostic factors: EF normal. Vessels involved and severity TBD by cath today  Lipids LDL 74, HDL 27  A1c 5.9  SP 2 KRISS to LAD and Diag on 3/12/18    PLAN  Aspirin 325   Clopidogrel 600 mg load, clopidogrel 75  Metoprolol 25 BID  Eptifibatide given 2 boluses + 12 hours continuous  Nitrates for recurrent chest pain  Oxygen if hypoxemic   Atorvastatin 80  Discontinue omeprazole due to interaction with plavix  Admit to telemetry         Coronary artery disease involving native heart without angina pectoris (Chronic) 12/27/2020 Yes    Obstructive sleep apnea syndrome (Chronic) 12/27/2020 Yes    Overview Signed 12/27/2020 12:11 PM by AURELIANO Marshall - CNP     Didn't quality for a machine               Plan:        1. Chest pain: Cardiology consulted yesterday, stress test today, continue home aspirin, Lipitor, Plavix, beta-blocker, and PPI, nitro as needed, troponins negative, EKG portion of stress test negative, awaiting nuclear medicine portion  2. DVT prophylaxis  3. Continue to monitor vitals and telemetry  4.  Monitor BMP and CBC and replete electrolytes as needed  5. A1c is 5.5  6. Lipid panel: Low HDL noted  7.  We will plan to discharge today if remaining component of stress test is normal    AURELIANO Fermin - CNP  12/28/2020  7:36 AM

## 2020-12-28 NOTE — PROGRESS NOTES
Pt tolerated lexiscan without experiencing side effect, recovered on cart and taken to [facility] for further testing in nad

## 2020-12-28 NOTE — PROCEDURES
[id number] [facility] [address]                 [address] [facility], [facility]                              CARDIAC STRESS TEST    PATIENT NAME: ORTEGA ALVES                          :        1965  MED REC NO:   [id number]                             ROOM:         ACCOUNT NO:   [de-identified]                           ADMIT DATE: 2020  PROVIDER:     Mikayla Spaulding    DATE OF STUDY:  2020    LEXISCAN MYOVIEW STRESS TEST    ATTENDING PROVIDER:  Job Dsouza. Don Becker MD    PRIMARY CARE PROVIDER:  Sha Abdullahi. Abimael Sands PA-C    PERFORMING PHYSICIAN:  Mikayla Spaulding MD    INDICATION:  Chest pain. HEART RATE  100% max predicted heart rate:  167  85% max predicted heart rate:  142  Duration:  1:00    Resting heart rate:  81  Maximum heart rate achieved:  96  % of predicted maximum:  57    BLOOD PRESSURE  Resting BP:  117/75  Peak BP:  119/78  METS:  1.0    MEDICATIONS GIVEN:  0.4 mg Lexiscan    REASON FOR TERMINATION:  Medication infusion complete. BASELINE EKG DEMONSTRATED:  Sinus rhythm. Normal EKG. During the stress test, the patient reported:  No symptoms. STRESS EKG DEMONSTRATED:  No abnormal change. HEART RATE RESPONSE:  Normal response. BLOOD PRESSURE RESPONSE:  Normal response. ECG IMPRESSION:  Negative. FINAL IMPRESSION:  Negative. Nuclear medicine report to follow.       Brenna Welch    D: 2020 9:55:51       T: 2020 9:57:28     BERENICE/SVETLANA_AUSTENIT  Job#: [id number]     Doc#: [id number]

## 2020-12-28 NOTE — CONSULTS
Section of Cardiology   Consult Note      Reason for Consult: Chest pain, CAD  Requesting Physician: Kleber Baca MD    CHIEF COMPLAINT: Chest pain    History Obtained From:  patient, electronic medical record, patient's nurse    HISTORY OF PRESENT ILLNESS:      The patient is a 50 y.o. male with significant past medical history of coronary disease who presents with another episode of chest pain. The patient was seen and examined in his room with his nurse and wife at bedside. The patient has history of premature coronary disease and in 2018 had 3 stents placed at [facility], to the LAD and 1 to the diagonal branch. Subsequently, in November 2019 he had a cardiac catheterization which showed disease in the diagonal branch and medical treatment was recommended. The patient saw his cardiologist recently as annual visit and he did not have any complaint. On the day of admission the patient reported substernal chest pain radiating to his back simulating his previous heart attack pain. He took 1 sublingual nitroglycerin with partial relief. He did not repeated, he called 911 brought to the emergency room. He was admitted and since admission and after he received nitroglycerin and morphine sulfate he did not have any further chest pains. The patient was nauseated but no vomiting. Did not have diaphoresis. The severity of the pain was moderate according to him. No PND orthopnea. No edema or claudication. No dyspnea at the time of chest pain. No dizziness or syncope. The patient is compliant with medications. Denies any bleeding  Previous diagnostic testing for coronary artery disease includes: cardiac catheterization, echocardiogram.   Previous history of cardiac disease includes: coronary artery disease and coronary artery stent. Coronary artery disease risk factors include: dyslipidemia, hypertension, male gender and obesity (BMI >= 30 kg/m2).     Past Medical History:    Past Medical History:   Diagnosis Date    Allergic rhinitis     Coronary artery disease involving native heart without angina pectoris 11/6/2018    GERD (gastroesophageal reflux disease)     Heart attack ([facility] [address] 03/12/2018    Patient had 3 stents placed    NSTEMI (non-ST elevated myocardial infarction) ([facility] [address] 3/23/2018     Past Surgical History:    Past Surgical History:   Procedure Laterality Date    CORONARY ANGIOPLASTY WITH STENT PLACEMENT  03/12/2018    3 stents xience alpine 4.0 x 18 mm LAD    KNEE SURGERY Left     at 26 yo     Home Medications:  Prior to Admission medications    Medication Sig Start Date End Date Taking? Authorizing Provider   atorvastatin (LIPITOR) 80 MG tablet Take 1 tablet by mouth daily 12/13/20  Yes Bettie Winn PA-C   clopidogrel (PLAVIX) 75 MG tablet Take 1 tablet by mouth daily 11/5/20  Yes Jasvir Castro, AURELIANO - CNP   pantoprazole (PROTONIX) 40 MG tablet Take 1 tablet by mouth daily 8/30/20  Yes Bettie Winn PA-C   metoprolol succinate (TOPROL XL) 50 MG extended release tablet Take 1 tablet by mouth daily  Patient taking differently: Take 100 mg by mouth daily  8/30/20  Yes Bettie Winn PA-C   SM ASPIRIN ADULT LOW STRENGTH 81 MG EC tablet TAKE 1 TABLET BY MOUTH ONE TIME A DAY  7/1/20  Yes Bettie Winn PA-C   nitroGLYCERIN (NITROSTAT) 0.4 MG SL tablet Place 1 tablet under the tongue every 5 minutes as needed for Chest pain up to max of 3 total doses.  If no relief after 1 dose, call 911. 3/23/18  Yes Bettie Winn PA-C   sildenafil (REVATIO) 20 MG tablet Take 2-3 tabs as needed prior to sexual activity 11/5/20   Ivanlon Covert, APRN - CNP   loratadine (CLARITIN) 10 MG capsule Take 1 capsule by mouth daily 8/26/19   Shelia Decker PA-C     Current Medications:    Current Facility-Administered Medications   Medication Dose Route Frequency Provider Last Rate Last Admin    sodium chloride flush 0.9 % injection 10 mL  10 mL Intravenous PRN Keren Bevel, APRN - CNP   10 mL at 12/28/20 0931    0.9 % sodium chloride infusion  500 mL Intravenous Continuous PRN AURELIANO Mart - CNP        atropine injection 0.5 mg  0.5 mg Intravenous Q5 Min PRN Dedrick Davis, AURELIANO - CNP        nitroGLYCERIN (NITROSTAT) SL tablet 0.4 mg  0.4 mg Sublingual Q5 Min PRN Dedrick Davis, AURELIANO - ANASTACIA        metoprolol (LOPRESSOR) injection 5 mg  5 mg Intravenous Q5 Min PRN Dedrick Davis, AURELIANO - CNP        aminophylline injection 50 mg  50 mg Intravenous PRN Dedrick Davis, AURELIANO - ANASTACIA        technetium sestamibi (CARDIOLITE) injection 20 millicurie  20 millicurie Intravenous ONCE PRN AURELIANO Mart CNP   22 millicurie at 02/56/76 0022    atorvastatin (LIPITOR) tablet 80 mg  80 mg Oral Daily AURELIANO Guadalupe - CNP   80 mg at 12/27/20 0827    clopidogrel (PLAVIX) tablet 75 mg  75 mg Oral Daily Elroy Silverman APRN - CNP   75 mg at 12/27/20 0827    cetirizine (ZYRTEC) tablet 10 mg  10 mg Oral Daily Elroy Silverman, APRN - CNP   10 mg at 12/27/20 0827    metoprolol succinate (TOPROL XL) extended release tablet 100 mg  100 mg Oral Daily Elroy Silverman, APRN - CNP   100 mg at 12/27/20 0825    pantoprazole (PROTONIX) tablet 40 mg  40 mg Oral Daily Elroy Silverman, APRN - CNP   40 mg at 12/27/20 0654    aspirin EC tablet 81 mg  81 mg Oral Daily Elroy Silverman, APRN - CNP   81 mg at 12/27/20 0825    sodium chloride flush 0.9 % injection 10 mL  10 mL Intravenous 2 times per day AURELIANO Guadalupe - CNP   10 mL at 12/28/20 0736    sodium chloride flush 0.9 % injection 10 mL  10 mL Intravenous PRN AURELIANO Guadalupe - ANASTACIA        promethazine (PHENERGAN) tablet 12.5 mg  12.5 mg Oral Q6H PRN AURELIANO Guadalupe - ANASTACIA        Or    ondansetron [facility]) injection 4 mg  4 mg Intravenous Q6H PRN Elroy Silverman APRN - ANASTACIA        acetaminophen (TYLENOL) tablet 650 mg  650 mg Oral Q6H PRN Elroy Silverman, APRN - Not on file     Gets together: Not on file     Attends Oriental orthodox service: Not on file     Active member of club or organization: Not on file     Attends meetings of clubs or organizations: Not on file     Relationship status: Not on file    Intimate partner violence     Fear of current or ex partner: Not on file     Emotionally abused: Not on file     Physically abused: Not on file     Forced sexual activity: Not on file   Other Topics Concern    Not on file   Social History Narrative    Not on file     Family History:   Family History   Problem Relation Age of Onset    Diabetes Mother     Diabetes Father          3/2020 sepsis    Liver Disease Father     Heart Disease Brother         also smoker    Heart Surgery Brother     Heart Attack Brother         x's 2    Heart Attack Paternal Uncle         5 of the brothers with heart disease    Heart Disease Paternal Uncle        · REVIEW OF SYSTEMS   As above. The patient denies any headache. No blurred vision. Denies any fever chills or cough. No bleeding from any orifice. No abdominal pain. No current back pain. Denies any edema or claudication.     PHYSICAL EXAM:    Vitals:    VITALS:  /75   Pulse 81   Temp 98.2 °F (36.8 °C) (Oral)   Resp 20   Ht 6' 4\" (1.93 m)   Wt 294 lb 12.8 oz (133.7 kg)   SpO2 97%   BMI 35.88 kg/m²   24HR INTAKE/OUTPUT:  No intake or output data in the 24 hours ending 20 1019    CONSTITUTIONAL:  awake, alert, cooperative, no apparent distress, and appears stated age  EYES: Pupils equal, round and reactive to light, extra ocular muscles intact, sclera clear, conjunctiva normal  ENT:  normocepalic, without obvious abnormality  NECK:  supple, symmetrical, trachea midline, no carotid bruit ,   No  JVD  BACK:  symmetric  LUNGS: Non-labored, good air exchange, clear to auscultation bilaterally, no crackles or wheezing  CARDIOVASCULAR:  Normal apical impulse, regular rate and rhythm, normal S1 and S2, no S3 or S4, and no murmur noted, no rub.  radial and bilateralpresent 2+  ABDOMEN:  No scars, normal bowel sounds, soft, non-distended, non-tender,  MUSCULOSKELETAL:  there is no redness, warmth, or swelling of the joints   No leg edema. NEUROLOGIC:  Awake, alert, oriented to name, place and time.   SKIN:  no bruising or bleeding, normal skin color, texture, turgor and no jaundice    DATA:   ECG: Admission EKG showed normal pattern  ECHO: Date:   Not performed to date  Stress Test: EKG portion of the stress test is negative for ischemia and awaiting the nuclear portion  Angiography:  Not performed to date    Cardiology Labs:  Recent Labs     12/26/20 2139 12/27/20  0034 12/27/20  0424   MYOGLOBIN 42  --   --    TROPONINT NOT REPORTED NOT REPORTED NOT REPORTED     Warfarin PT/INR:No results found for: PROTIME, INR, WARFARIN  CBC:  Lab Results   Component Value Date    WBC 6.3 12/28/2020    RBC 4.63 12/28/2020    HGB 14.1 12/28/2020    HCT 42.5 12/28/2020    MCV 91.8 12/28/2020    MCH 30.5 12/28/2020    MCHC 33.2 12/28/2020    RDW 12.4 12/28/2020     12/28/2020    MPV 9.9 12/28/2020     CMP:  Lab Results   Component Value Date     12/28/2020    K 4.0 12/28/2020     12/28/2020    CO2 27 12/28/2020    BUN 13 12/28/2020    CREATININE 0.75 12/28/2020    GFRAA >60 12/28/2020    LABGLOM >60 12/28/2020    GLUCOSE 128 12/28/2020    CALCIUM 9.0 12/28/2020     Magnesium:    Lab Results   Component Value Date    MG 1.8 12/27/2020     PTT:  No results found for: APTT, PTT  TSH:  No results found for: TSH  BMP:  Lab Results   Component Value Date     12/28/2020    K 4.0 12/28/2020     12/28/2020    CO2 27 12/28/2020    BUN 13 12/28/2020    LABALBU 3.6 12/27/2020    CREATININE 0.75 12/28/2020    CALCIUM 9.0 12/28/2020    GFRAA >60 12/28/2020    LABGLOM >60 12/28/2020    GLUCOSE 128 12/28/2020     LIVER PROFILE:  Recent Labs     12/27/20  0424   AST 22   ALT 37   LABALBU 3.6   ALKPHOS 44   BILITOT 0.69   PROT 6.7 ALBUMIN NOT REPORTED     FLP:    Lab Results   Component Value Date    CHOL 109 12/27/2020    CHOL 99 11/11/2020    TRIG 151 12/27/2020    HDL 26 12/27/2020    LDLCHOLESTEROL 53 12/27/2020             IMPRESSION  #1 chest pain suggestive for angina without evidence of acute coronary syndrome  2. History of coronary disease with stenting to the LAD and diagonal branch in 2018 with recurrent stenosis into the diagonal branch by cardiac catheterization done in 2019  #3 hyperlipidemia    Patient Active Problem List   Diagnosis    GERD (gastroesophageal reflux disease)    Allergic rhinitis    Non-ST elevation myocardial [facility] ([facility]STEMI) [facility] [address]    Coronary artery disease involving native heart without angina pectoris    Obstructive sleep apnea syndrome    Prediabetes    Chest pain           RECOMMENDATIONS:     Continue current medications  Management plan was discussed with patient and his wife  Awaiting final results of the stress test  Explained to the patient the need to follow-up with his cardiologist regardless of the results of the stress test.  He may need to be on long-acting nitrate. Continue aspirin, Plavix, statin and beta-blocker. Discussed with the patient's wife as well and his nurse.       Electronically signed by Tyler Parker MD on 12/28/2020 at 10:19 AM     CC: Orly Nathan PA-C

## 2020-12-28 NOTE — DISCHARGE SUMMARY
[facility]  Office: [address], DO, Didi Velazco, DO, Therese Shahid, DO, Olvin Canada, DO, Selena Pan MD, Hunter Bruce MD, Melvi Mcclendon MD, Gianna Carpio MD, Josie Russo MD, Nancy Lancaster MD, Marisol Gale MD, Dee Cali MD, Jhon Sherman MD, Fahad Sawyer DO, Floresita Britton MD, Kelly Richardson MD, Romain De DO, Rebecca Lin MD,  Dawn Porras, DO, Tony Mast MD, Elvis Monroy MD, Federico Galarza [facility], [facility], [facility], Peggy Martin, CNP, Henry Morris, CNS, Jose Vasquez, CNP, Roberto Mcduffie, CNP, Raquel Perez, CNP, Dionicia Frankel, [facility], Rosalba Wetzel, CNP, Cuca Bergeron PA-C, Junior Geiger, [facility], Mickey Bravo, CNP, Benjamin Siu, CNP, Tim Gross, CNP, Randal Conn, [facility], Zina Ludwigs, [facility]    Discharge Summary     Patient ID: Primo Dang  :  1965   MRN: [id number]     ACCOUNT:  [de-identified]   Patient's PCP: Jones Costa PA-C  Admit Date: 2020   Discharge Date: 2020     Length of Stay: 0  Code Status:  Full Code  Admitting Physician: Daniela Joshua MD  Discharge Physician: Daniela Joshua MD     Active Discharge Diagnoses:     Hospital Problem Lists:  Principal Problem:    Chest pain  Active Problems:    GERD (gastroesophageal reflux disease)    Non-ST elevation myocardial infarction (NSTEMI) ([facility] [address]    Coronary artery disease involving native heart without angina pectoris    Obstructive sleep apnea syndrome  Resolved Problems:    * No resolved hospital problems.  *      Admission Condition:  fair     Discharged Condition: good    Hospital Stay:     Hospital Course:  Primo Dang is a 50 y.o. male who was admitted for the management of  Chest pain , presented to ER with Chest Pain (x3 stents placed in 3/2018, mid-sternal chest pain started x1 hour ago, radiates straight into back), Shortness of Breath, and Nausea    Patient presented to the emergency department with a chief complaint of chest pain that started approximately an hour before arrival.  The chest pain radiated through his shoulders, and associated nausea was experienced. He did take nitroglycerin prior to leaving his home, which provided slight improvement in symptoms. He was given morphine in the emergency department which improved his pain. He sees Dr. William Yap at [facility] for cardiology. His Toprol dose was increased from 50 mg to 100 mg daily. Cardiology was consulted and recommended stress testing. Stress testing was completed today and was noted to be normal.  He is no longer experiencing chest pain, and tolerated stress test well. Significant therapeutic interventions:   EKG monitoring  Lab monitoring  Cardiology consultation  Trending of cardiac markers  Nuclear stress test  Chest x-ray  A1c and lipid panel      Significant Diagnostic Studies:   Labs / Micro:  CBC:   Lab Results   Component Value Date    WBC 6.3 12/28/2020    RBC 4.63 12/28/2020    HGB 14.1 12/28/2020    HCT 42.5 12/28/2020    MCV 91.8 12/28/2020    MCH 30.5 12/28/2020    MCHC 33.2 12/28/2020    RDW 12.4 12/28/2020     12/28/2020     BMP:    Lab Results   Component Value Date    GLUCOSE 128 12/28/2020     12/28/2020    K 4.0 12/28/2020     12/28/2020    CO2 27 12/28/2020    ANIONGAP 7 12/28/2020    BUN 13 12/28/2020    CREATININE 0.75 12/28/2020    BUNCRER 17 12/28/2020    CALCIUM 9.0 12/28/2020    LABGLOM >60 12/28/2020    GFRAA >60 12/28/2020    GFR      12/28/2020    GFR NOT REPORTED 12/28/2020     PT/INR:  No results found for: PTINR, PROTIME, INR  PTT: No results found for: APTT  FLP:    Lab Results   Component Value Date    CHOL 109 12/27/2020    CHOL 99 11/11/2020    TRIG 151 12/27/2020    HDL 26 12/27/2020       Radiology:  Xr Chest Portable    Result Date: 12/26/2020  No acute abnormality.        Consultations:    Consults:     Final Specialist Recommendations/Findings:   LILI medications    sildenafil 20 MG tablet  Commonly known as: REVATIO           Where to Get Your Medications      These medications were sent to [address] [place] [address] [phone] - F [phone]  [address] [place] [address], [address]MA [address]    Phone: [phone]   · metoprolol succinate 100 MG extended release tablet         No discharge procedures on file. Time Spent on discharge is  40 mins in patient examination, evaluation, counseling as well as medication reconciliation, prescriptions for required medications, discharge plan and follow up. Electronically signed by   Fred Khan MD  12/28/2020  5:45 PM      Thank you Dr. Dorian Mckeon PA-C for the opportunity to be involved in this patient's care.

## 2021-01-04 ENCOUNTER — TELEPHONE (OUTPATIENT)
Dept: FAMILY MEDICINE CLINIC | Age: 56
End: 2021-01-04

## 2021-01-04 LAB
EKG ATRIAL RATE: 77 BPM
EKG P AXIS: 43 DEGREES
EKG P-R INTERVAL: 182 MS
EKG Q-T INTERVAL: 374 MS
EKG QRS DURATION: 88 MS
EKG QTC CALCULATION (BAZETT): 423 MS
EKG R AXIS: 16 DEGREES
EKG T AXIS: 10 DEGREES
EKG VENTRICULAR RATE: 77 BPM

## 2021-01-04 NOTE — TELEPHONE ENCOUNTER
[facility] received a call from:    Name of Caller: Charlie Rodriguez    Relationship to patient:self    Organization name: n/a    Best contact [facility]     Reason for call: pt was seen 12/26 at [facility] for chest pain Sue Ramirez) and now needs a note to return to work. Pt wants to know if he needs to be seen or if he can just pick the note up, please advise. Was tested fr covid twice both test came back negative.  Needs note today pt states he is trying to return to work 1/5/21

## 2021-01-04 NOTE — TELEPHONE ENCOUNTER
I reviewed hospital work up. I don't see that there were any work restrictions placed.  So as long as he knows his cardiologist is ok with him working you can give him a note from me to return today

## 2021-01-04 NOTE — TELEPHONE ENCOUNTER
Patient scheduled a vv Thursday which is the next available time. He says he cant return to work today if he does not get that note.  Please advise

## 2021-01-12 ENCOUNTER — OFFICE VISIT (OUTPATIENT)
Dept: FAMILY MEDICINE CLINIC | Age: 56
End: 2021-01-12
Payer: COMMERCIAL

## 2021-01-12 VITALS
DIASTOLIC BLOOD PRESSURE: 88 MMHG | BODY MASS INDEX: 36.04 KG/M2 | WEIGHT: 296 LBS | SYSTOLIC BLOOD PRESSURE: 132 MMHG | HEIGHT: 76 IN | OXYGEN SATURATION: 97 % | HEART RATE: 80 BPM

## 2021-01-12 DIAGNOSIS — L82.1 SEBORRHEIC KERATOSIS: ICD-10-CM

## 2021-01-12 DIAGNOSIS — B35.6 JOCK ITCH: Primary | ICD-10-CM

## 2021-01-12 PROCEDURE — 99213 OFFICE O/P EST LOW 20 MIN: CPT | Performed by: PHYSICIAN ASSISTANT

## 2021-01-12 RX ORDER — CLOTRIMAZOLE AND BETAMETHASONE DIPROPIONATE 10; .64 MG/G; MG/G
CREAM TOPICAL
Qty: 60 G | Refills: 0 | Status: CANCELLED | OUTPATIENT
Start: 2021-01-12

## 2021-01-12 RX ORDER — CLOTRIMAZOLE 1 %
CREAM (GRAM) TOPICAL
Qty: 60 G | Refills: 0 | Status: SHIPPED | OUTPATIENT
Start: 2021-01-12 | End: 2021-01-19

## 2021-01-12 ASSESSMENT — PATIENT HEALTH QUESTIONNAIRE - PHQ9
SUM OF ALL RESPONSES TO PHQ QUESTIONS 1-9: 0
2. FEELING DOWN, DEPRESSED OR HOPELESS: 0
SUM OF ALL RESPONSES TO PHQ9 QUESTIONS 1 & 2: 0
SUM OF ALL RESPONSES TO PHQ QUESTIONS 1-9: 0

## 2021-01-12 ASSESSMENT — ENCOUNTER SYMPTOMS: COLOR CHANGE: 0

## 2021-01-12 NOTE — PROGRESS NOTES
Visit Information    Have you changed or started any medications since your last visit including any over-the-counter medicines, vitamins, or herbal medicines? no   Are you having any side effects from any of your medications? -  no  Have you stopped taking any of your medications? Is so, why? -  no    Have you seen any other physician or provider since your last visit? No  Have you had any other diagnostic tests since your last visit? No  Have you been seen in the emergency room and/or had an admission to a hospital since we last saw you? No  Have you had your routine dental cleaning in the past 6 months? no    Have you activated your [employer] account? If not, what are your barriers?  No:      Patient Care Team:  Rubio Schmitz PA-C as PCP - General (Family Medicine)  Rubio Schmitz PA-C as PCP - [facility]    Medical History Review  Past Medical, Family, and Social History reviewed and does contribute to the patient presenting condition    Health Maintenance   Topic Date Due    Hepatitis C screen  1965    HIV screen  07/05/1982    DTaP/Tdap/Td vaccine (1 - Tdap) 07/05/1986    Flu vaccine (1) 09/01/2020    A1C test (Diabetic or Prediabetic)  12/27/2021    Lipid screen  12/27/2021    Colon cancer screen colonoscopy  06/12/2029    Shingles Vaccine  Completed    Hepatitis A vaccine  Aged Out    Hepatitis B vaccine  Aged Out    Hib vaccine  Aged Out    Meningococcal (ACWY) vaccine  Aged Out    Pneumococcal 0-64 years Vaccine  Aged Out

## 2021-01-12 NOTE — PROGRESS NOTES
[address]  [address] [facility] [address] [address]  Dept: [phone]  Dept Fax: [phone]    Melvi Adams is a 50 y.o. male who presents today for his medical conditions/complaintsas noted below. Melvi Adams is c/o of   Chief Complaint   Patient presents with    Mole     on his back, not bothering him just wants it looked at  also on his genital- looks like a mole, itches but doesnt hurt          HPI:     HPI    Patient states he has had a new spot on his back form over the last month. He also notes that he has a few lesions on the genitals as well. Wife told him present a few months. Has been itching in the genital area.      Hemoglobin A1C (%)   Date Value   2020 5.5   2020 5.6   2019 5.3             ( goal A1Cis < 7)   No results found for: LABMICR  LDL Cholesterol (mg/dL)   Date Value   2020 53     LDL Calculated (mg/dL)   Date Value   2020 51   2019 36   2018 35       (goal LDL is <100)   AST (U/L)   Date Value   2020 22     ALT (U/L)   Date Value   2020 37     BUN (mg/dL)   Date Value   2020 13     BP Readings from Last 3 Encounters:   21 132/88   20 117/75   20 112/70          (goal 120/80)    Past Medical History:   Diagnosis Date    Allergic rhinitis     Coronary artery disease involving native heart without angina pectoris 2018    GERD (gastroesophageal reflux disease)     Heart attack ([address] 2018    Patient had 3 stents placed    NSTEMI (non-ST elevated myocardial infarction) ([address] 3/23/2018      Past Surgical History:   Procedure Laterality Date    CORONARY ANGIOPLASTY WITH STENT PLACEMENT  2018    3 stents xience alpine 4.0 x 18 mm LAD    KNEE SURGERY Left     at 28 yo       Family History   Problem Relation Age of Onset    Diabetes Mother     Diabetes Father          3/2020 sepsis    Liver Disease Father     Heart Disease Brother

## 2021-02-01 RX ORDER — PANTOPRAZOLE SODIUM 40 MG/1
TABLET, DELAYED RELEASE ORAL
Qty: 90 TABLET | Refills: 0 | Status: SHIPPED | OUTPATIENT
Start: 2021-02-01 | End: 2021-09-20 | Stop reason: SDUPTHER

## 2021-09-20 RX ORDER — LORATADINE 10 MG/1
10 CAPSULE, LIQUID FILLED ORAL DAILY
Qty: 90 CAPSULE | Refills: 3 | Status: SHIPPED | OUTPATIENT
Start: 2021-09-20

## 2021-09-20 RX ORDER — PANTOPRAZOLE SODIUM 40 MG/1
40 TABLET, DELAYED RELEASE ORAL DAILY
Qty: 90 TABLET | Refills: 0 | Status: SHIPPED | OUTPATIENT
Start: 2021-09-20 | End: 2021-12-29 | Stop reason: SDUPTHER

## 2021-12-29 ENCOUNTER — E-VISIT (OUTPATIENT)
Dept: FAMILY MEDICINE CLINIC | Age: 56
End: 2021-12-29
Payer: COMMERCIAL

## 2021-12-29 DIAGNOSIS — K21.9 GASTROESOPHAGEAL REFLUX DISEASE WITHOUT ESOPHAGITIS: Primary | ICD-10-CM

## 2021-12-29 PROCEDURE — 99421 OL DIG E/M SVC 5-10 MIN: CPT | Performed by: PHYSICIAN ASSISTANT

## 2021-12-29 RX ORDER — PANTOPRAZOLE SODIUM 40 MG/1
40 TABLET, DELAYED RELEASE ORAL DAILY
Qty: 90 TABLET | Refills: 3 | Status: SHIPPED | OUTPATIENT
Start: 2021-12-29

## 2022-08-15 ENCOUNTER — OFFICE VISIT (OUTPATIENT)
Dept: PRIMARY CARE CLINIC | Age: 57
End: 2022-08-15
Payer: COMMERCIAL

## 2022-08-15 VITALS
HEIGHT: 76 IN | WEIGHT: 272 LBS | TEMPERATURE: 98.2 F | OXYGEN SATURATION: 97 % | BODY MASS INDEX: 33.12 KG/M2 | SYSTOLIC BLOOD PRESSURE: 101 MMHG | HEART RATE: 92 BPM | DIASTOLIC BLOOD PRESSURE: 72 MMHG

## 2022-08-15 DIAGNOSIS — U07.1 COVID: Primary | ICD-10-CM

## 2022-08-15 PROCEDURE — 99213 OFFICE O/P EST LOW 20 MIN: CPT | Performed by: FAMILY MEDICINE

## 2022-08-15 RX ORDER — AMLODIPINE BESYLATE 2.5 MG/1
TABLET ORAL
COMMUNITY
Start: 2022-08-14

## 2022-08-15 RX ORDER — LORATADINE 10 MG/1
TABLET ORAL
COMMUNITY
Start: 2022-07-16 | End: 2022-08-15 | Stop reason: SDUPTHER

## 2022-08-15 ASSESSMENT — PATIENT HEALTH QUESTIONNAIRE - PHQ9
SUM OF ALL RESPONSES TO PHQ QUESTIONS 1-9: 0
2. FEELING DOWN, DEPRESSED OR HOPELESS: 0
SUM OF ALL RESPONSES TO PHQ QUESTIONS 1-9: 0
SUM OF ALL RESPONSES TO PHQ QUESTIONS 1-9: 0
SUM OF ALL RESPONSES TO PHQ9 QUESTIONS 1 & 2: 0
SUM OF ALL RESPONSES TO PHQ QUESTIONS 1-9: 0
1. LITTLE INTEREST OR PLEASURE IN DOING THINGS: 0

## 2022-08-15 ASSESSMENT — ENCOUNTER SYMPTOMS
COUGH: 1
SORE THROAT: 1
CHANGE IN BOWEL HABIT: 0
VOMITING: 0

## 2022-08-15 NOTE — PROGRESS NOTES
[address] [facility]  [address]  [place] [address]  Dept: [phone]  Dept Fax: [phone]    Trevon Greene is a 56 y.o. male who presents today for his medical conditions/complaintsas noted below. Trevon Greene is c/o of Fatigue (Body aches, cough, nasal congestion - started on Friday night)        HPI:     Fatigue  This is a new problem. The current episode started in the past 7 days (3 days). The problem occurs constantly. The problem has been unchanged. Associated symptoms include congestion, coughing, fatigue, a fever, myalgias and a sore throat. Pertinent negatives include no change in bowel habit or vomiting. Nothing aggravates the symptoms. Treatments tried: robitussin, tylenol, coricidin. The treatment provided mild relief.    Past medical history of allergic rhinitis, CAD/MI, GERD  Wife has similar symptoms  Positive at home covid test  Had PCR for COVID done at [facility] and awaiting results  Past Medical History:   Diagnosis Date    Allergic rhinitis     Coronary artery disease involving native heart without angina pectoris 2018    GERD (gastroesophageal reflux disease)     Heart attack ([facility] [address] 2018    Patient had 3 stents placed    NSTEMI (non-ST elevated myocardial infarction) ([facility] [address] 3/23/2018    Past medical history reviewed and pertinent positives/negatives in the HPI    Past Surgical History:   Procedure Laterality Date    CORONARY ANGIOPLASTY WITH STENT PLACEMENT  2018    3 stents xience alpine 4.0 x 18 mm LAD    KNEE SURGERY Left     at 28 yo       Family History   Problem Relation Age of Onset    Diabetes Mother     Diabetes Father          3/2020 sepsis    Liver Disease Father     Heart Disease Brother         also smoker    Heart Surgery Brother     Heart Attack Brother         x's 2    Heart Attack Paternal Uncle         11 of the brothers with heart disease    Heart Disease Paternal Uncle Social History     Tobacco Use    Smoking status: Never    Smokeless tobacco: Never   Substance Use Topics    Alcohol use: No     Alcohol/week: 0.0 standard drinks      Current Outpatient Medications   Medication Sig Dispense Refill    amLODIPine (NORVASC) 2.5 MG tablet       nirmatrelvir/ritonavir (PAXLOVID) 20 x 150 MG & 10 x 100MG Take 3 tablets (two 150 mg nirmatrelvir and one 100 mg ritonavir tablets) by mouth every 12 hours for 5 days. 30 tablet 0    pantoprazole (PROTONIX) 40 MG tablet Take 1 tablet by mouth daily 90 tablet 3    loratadine (CLARITIN) 10 MG capsule Take 1 capsule by mouth daily 90 capsule 3    metoprolol succinate (TOPROL XL) 100 MG extended release tablet Take 1 tablet by mouth daily 30 tablet 3    SM ASPIRIN ADULT LOW STRENGTH 81 MG EC tablet TAKE 1 TABLET BY MOUTH ONE TIME A DAY  90 tablet 3    nitroGLYCERIN (NITROSTAT) 0.4 MG SL tablet Place 1 tablet under the tongue every 5 minutes as needed for Chest pain up to max of 3 total doses. If no relief after 1 dose, call 911. 25 tablet 0    atorvastatin (LIPITOR) 80 MG tablet Take 1 tablet by mouth daily (Patient not taking: Reported on 8/15/2022) 90 tablet 3    clopidogrel (PLAVIX) 75 MG tablet Take 1 tablet by mouth daily (Patient not taking: Reported on 8/15/2022) 90 tablet 0     No current facility-administered medications for this visit.      No Known Allergies    Health Maintenance   Topic Date Due    COVID-19 Vaccine (1) Never done    HIV screen  Never done    Hepatitis C screen  Never done    DTaP/Tdap/Td vaccine (1 - Tdap) Never done    A1C test (Diabetic or Prediabetic)  12/27/2021    Lipids  12/27/2021    Depression Screen  01/12/2022    Flu vaccine (1) 09/01/2022    Colorectal Cancer Screen  06/12/2029    Shingles vaccine  Completed    Hepatitis A vaccine  Aged Out    Hepatitis B vaccine  Aged Out    Hib vaccine  Aged Out    Meningococcal (ACWY) vaccine  Aged Out    Pneumococcal 0-64 years Vaccine  Aged Out       :      Review of Systems   Constitutional:  Positive for fatigue and fever. HENT:  Positive for congestion and sore throat. Respiratory:  Positive for cough. Gastrointestinal:  Negative for change in bowel habit and vomiting. Musculoskeletal:  Positive for myalgias. Objective:     Physical Exam  Vitals and nursing note reviewed. Constitutional:       Appearance: Normal appearance. He is obese. He is ill-appearing. HENT:      Head: Normocephalic and atraumatic. Right Ear: Hearing, tympanic membrane, ear canal and external ear normal.      Left Ear: Hearing, tympanic membrane, ear canal and external ear normal.      Nose: Nose normal.      Mouth/Throat:      Lips: Pink. Mouth: Mucous membranes are moist.      Pharynx: Oropharynx is clear. Eyes:      Extraocular Movements: Extraocular movements intact. Conjunctiva/sclera: Conjunctivae normal.   Cardiovascular:      Rate and Rhythm: Normal rate and regular rhythm. Heart sounds: Normal heart sounds. Pulmonary:      Effort: Pulmonary effort is normal.      Breath sounds: Normal breath sounds. Musculoskeletal:      Cervical back: Normal range of motion. No muscular tenderness. Skin:     General: Skin is warm and dry. Neurological:      Mental Status: He is alert and oriented to person, place, and time. Mental status is at baseline. Psychiatric:         Mood and Affect: Mood normal.         Behavior: Behavior normal.         Thought Content: Thought content normal.         Judgment: Judgment normal.     /72   Pulse 92   Temp 98.2 °F (36.8 °C) (Oral)   Ht 6' 4\" (1.93 m)   Wt 272 lb (123.4 kg)   SpO2 97%   BMI 33.11 kg/m²     Assessment:       Diagnosis Orders   1.  COVID            Plan:    While taking paxlovid decrease amlodipine by 50%   Continue over the counter cough/cold medications as needed for symptoms  If symptoms worsen or do not improve please follow-up with PCP or return to clinic    No orders of the defined types were placed in this encounter. Orders Placed This Encounter   Medications    nirmatrelvir/ritonavir (PAXLOVID) 20 x 150 MG & 10 x 100MG     Sig: Take 3 tablets (two 150 mg nirmatrelvir and one 100 mg ritonavir tablets) by mouth every 12 hours for 5 days. Dispense:  30 tablet     Refill:  0     Order Specific Question:   Does this patient qualify for COVID-19 antIviral therapy based on criteria for treatment? Answer:   Yes      Patient given educational materials - see patient instructions. Discussed use, benefit, and side effects of prescribed medications. All patient questions answered. Pt voiced understanding. Patient agreed with treatment plan. Follow up as directed.      Electronicallysigned by Bridget Melara MD on 8/15/2022 at 11:58 AM

## 2022-08-15 NOTE — LETTER
[address] [address] [facility] [address]  Phone: [phone]  Fax: [phone]    Natalio Turcios MD        August 15, 2022     Patient: Nely Villarreal   YOB: 1965   Date of Visit: 8/15/2022       To Whom it May Concern:    Nely Villarreal was seen in my clinic for COVID-19 on 8/15/2022. He is to be excused from work. If you have any questions or concerns, please don't hesitate to call.     Sincerely,         Natalio Turcios MD

## 2022-11-13 ENCOUNTER — OFFICE VISIT (OUTPATIENT)
Dept: PRIMARY CARE CLINIC | Age: 57
End: 2022-11-13
Payer: COMMERCIAL

## 2022-11-13 VITALS
OXYGEN SATURATION: 96 % | TEMPERATURE: 97.7 F | HEART RATE: 78 BPM | SYSTOLIC BLOOD PRESSURE: 109 MMHG | DIASTOLIC BLOOD PRESSURE: 72 MMHG

## 2022-11-13 DIAGNOSIS — S99.912A ANKLE INJURY, LEFT, INITIAL ENCOUNTER: Primary | ICD-10-CM

## 2022-11-13 PROCEDURE — 99203 OFFICE O/P NEW LOW 30 MIN: CPT | Performed by: NURSE PRACTITIONER

## 2022-11-13 SDOH — ECONOMIC STABILITY: FOOD INSECURITY: WITHIN THE PAST 12 MONTHS, YOU WORRIED THAT YOUR FOOD WOULD RUN OUT BEFORE YOU GOT MONEY TO BUY MORE.: NEVER TRUE

## 2022-11-13 SDOH — ECONOMIC STABILITY: FOOD INSECURITY: WITHIN THE PAST 12 MONTHS, THE FOOD YOU BOUGHT JUST DIDN'T LAST AND YOU DIDN'T HAVE MONEY TO GET MORE.: NEVER TRUE

## 2022-11-13 ASSESSMENT — ENCOUNTER SYMPTOMS
NAUSEA: 0
ABDOMINAL PAIN: 0
EYE PAIN: 0
DIARRHEA: 0
BACK PAIN: 0
SHORTNESS OF BREATH: 0
VOMITING: 0
SINUS PAIN: 0
COUGH: 0
SORE THROAT: 0

## 2022-11-13 ASSESSMENT — SOCIAL DETERMINANTS OF HEALTH (SDOH): HOW HARD IS IT FOR YOU TO PAY FOR THE VERY BASICS LIKE FOOD, HOUSING, MEDICAL CARE, AND HEATING?: NOT HARD AT ALL

## 2022-11-13 NOTE — PROGRESS NOTES
[address] [facility]  [address] [address]  Dept: [phone]  Dept Fax: [phone]    Swathi Rodriguez is a 56 y.o. male who presents today for his medicalconditions/complaints as noted below. Swathi Rodriguez is c/o of Ankle Injury (Twisted ankle yesterday, left ankle, swollen and bruised)      HPI:       53-year-old male patient presents with concerns for left ankle injury. Patient reportedly rolled his ankle 3 days ago. Patient has moderate pain, swelling to the left ankle. Patient is able to ambulate with moderate discomfort. No history of problems or surgeries to the ankle. Has tried ice, Tylenol. Past Medical History:   Diagnosis Date    Allergic rhinitis     Coronary artery disease involving native heart without angina pectoris 11/6/2018    GERD (gastroesophageal reflux disease)     Heart attack ([facility] [address] 03/12/2018    Patient had 3 stents placed    [facility] 3/23/2018        Current Outpatient Medications   Medication Sig Dispense Refill    amLODIPine (NORVASC) 2.5 MG tablet       pantoprazole (PROTONIX) 40 MG tablet Take 1 tablet by mouth daily 90 tablet 3    loratadine (CLARITIN) 10 MG capsule Take 1 capsule by mouth daily 90 capsule 3    metoprolol succinate (TOPROL XL) 100 MG extended release tablet Take 1 tablet by mouth daily 30 tablet 3    SM ASPIRIN ADULT LOW STRENGTH 81 MG EC tablet TAKE 1 TABLET BY MOUTH ONE TIME A DAY  90 tablet 3    atorvastatin (LIPITOR) 80 MG tablet Take 1 tablet by mouth daily (Patient not taking: No sig reported) 90 tablet 3    clopidogrel (PLAVIX) 75 MG tablet Take 1 tablet by mouth daily (Patient not taking: No sig reported) 90 tablet 0    nitroGLYCERIN (NITROSTAT) 0.4 MG SL tablet Place 1 tablet under the tongue every 5 minutes as needed for Chest pain up to max of 3 total doses. If no relief after 1 dose, call 911.  (Patient not taking: Reported on 11/13/2022) 25 tablet 0     No current facility-administered medications for this visit. No Known Allergies    Subjective:      Review of Systems   Constitutional:  Negative for chills and fatigue. HENT:  Negative for congestion, ear pain, sinus pain and sore throat. Eyes:  Negative for pain and visual disturbance. Respiratory:  Negative for cough and shortness of breath. Cardiovascular:  Negative for chest pain and palpitations. Gastrointestinal:  Negative for abdominal pain, diarrhea, nausea and vomiting. Genitourinary:  Negative for penile pain and testicular pain. Musculoskeletal:  Positive for arthralgias and joint swelling (left ankle). Negative for back pain and neck pain. Skin:  Negative for rash. Neurological:  Negative for dizziness and light-headedness. Hematological:  Does not bruise/bleed easily. All other systems reviewed and are negative.    :Objective     Physical Exam  Vitals and nursing note reviewed. Constitutional:       Appearance: Normal appearance. Cardiovascular:      Rate and Rhythm: Normal rate. Pulmonary:      Effort: Pulmonary effort is normal.      Breath sounds: Normal breath sounds. Musculoskeletal:      Left ankle: Swelling present. Tenderness present. Neurological:      General: No focal deficit present. Mental Status: He is alert and oriented to person, place, and time. /72 (Site: Left Upper Arm, Position: Sitting, Cuff Size: Large Adult)   Pulse 78   Temp 97.7 °F (36.5 °C) (Oral)   SpO2 96%     Lab Review   No visits with results within 6 Month(s) from this visit.    Latest known visit with results is:   Admission on 12/26/2020, Discharged on 12/28/2020   Component Date Value    WBC 12/26/2020 9.4     RBC 12/26/2020 4.73     Hemoglobin 12/26/2020 14.5     Hematocrit 12/26/2020 42.5     MCV 12/26/2020 89.9     MCH 12/26/2020 30.7     MCHC 12/26/2020 34.1     RDW 12/26/2020 12.5     Platelets 39/61/4065 136 (A) MPV 12/26/2020 9.7     NRBC Automated 12/26/2020 0.0     Differential Type 12/26/2020 NOT REPORTED     Seg Neutrophils 12/26/2020 83 (A)     Lymphocytes 12/26/2020 10 (A)     Monocytes 12/26/2020 5     Eosinophils % 12/26/2020 2     Basophils 12/26/2020 0     Immature Granulocytes 12/26/2020 0     Segs Absolute 12/26/2020 7.73     Absolute Lymph # 12/26/2020 0.90 (A)     Absolute Mono # 12/26/2020 0.51     Absolute Eos # 12/26/2020 0.20     Basophils Absolute 12/26/2020 0.04     Absolute Immature Granul* 12/26/2020 0.03     WBC Morphology 12/26/2020 NOT REPORTED     RBC Morphology 12/26/2020 NOT REPORTED     Platelet Estimate 70/44/4302 NOT REPORTED     Glucose 12/26/2020 136 (A)     BUN 12/26/2020 14     Creatinine 12/26/2020 0.89     Bun/Cre Ratio 12/26/2020 16     Calcium 12/26/2020 9.2     Sodium 12/26/2020 137     Potassium 12/26/2020 3.9     Chloride 12/26/2020 102     CO2 12/26/2020 23     Anion Gap 12/26/2020 12     GFR Non- 12/26/2020 >60     GFR  12/26/2020 >60     GFR Comment 12/26/2020          GFR Staging 12/26/2020 NOT REPORTED     Troponin, High Sensitivi* 12/26/2020 <6     Troponin T 12/26/2020 NOT REPORTED     Troponin Interp 12/26/2020 NOT REPORTED     Myoglobin 12/26/2020 42     Ventricular Rate 12/26/2020 109     Atrial Rate 12/26/2020 109     P-R Interval 12/26/2020 158     QRS Duration 12/26/2020 86     Q-T Interval 12/26/2020 312     QTc Calculation (Bazett) 12/26/2020 420     P Axis 12/26/2020 37     R Haugan 12/26/2020 11     T Haugan 12/26/2020 58     SARS-CoV-2 12/26/2020          SARS-CoV-2, Rapid 12/26/2020 Not Detected     Source 12/26/2020 . NASOPHARYNGEAL SWAB     SARS-CoV-2 12/26/2020          D-Dimer, Quant 12/26/2020 0.34     Hemoglobin A1C 12/27/2020 5.5     Estimated Avg Glucose 12/27/2020 111     Troponin, High Sensitivi* 12/27/2020 <6     Troponin T 12/27/2020 NOT REPORTED     Troponin Interp 12/27/2020 NOT REPORTED     Troponin, High Sensitivi* 12/27/2020 <6     Troponin T 12/27/2020 NOT REPORTED     Troponin Interp 12/27/2020 NOT REPORTED     Color, UA 12/27/2020 DARK YELLOW (A)     Turbidity UA 12/27/2020 CLEAR     Glucose, Ur 12/27/2020 NEGATIVE     Bilirubin Urine 12/27/2020 NEGATIVE     Ketones, Urine 12/27/2020 TRACE (A)     Specific Wirt, UA 12/27/2020 1.025     Urine Hgb 12/27/2020 NEGATIVE     pH, UA 12/27/2020 6.0     Protein, UA 12/27/2020 NEGATIVE     Urobilinogen, Urine 12/27/2020 Normal     Nitrite, Urine 12/27/2020 NEGATIVE     Leukocyte Esterase, Urine 12/27/2020 NEGATIVE     Urinalysis Comments 12/27/2020 NOT REPORTED     Specimen Description 12/27/2020 . NASOPHARYNGEAL SWAB     Adenovirus PCR 12/27/2020 Not Detected     Coronavirus 229E PCR 12/27/2020 Not Detected     Coronavirus HKU1 PCR 12/27/2020 Not Detected     Coronavirus NL63 PCR 12/27/2020 Not Detected     Coronavirus OC43 PCR 12/27/2020 Not Detected     SARS-CoV-2, PCR 12/27/2020 Not Detected     Human Metapneumovirus PCR 12/27/2020 Not Detected     Rhino/Enterovirus PCR 12/27/2020 Not Detected     Influenza A by PCR 12/27/2020 Not Detected     Influenza A H1 PCR 12/27/2020 NOT REPORTED     Influenza A H1 (2009) PCR 12/27/2020 NOT REPORTED     Influenza A H3 PCR 12/27/2020 NOT REPORTED     Influenza B by PCR 12/27/2020 Not Detected     Parainfluenza 1 PCR 12/27/2020 Not Detected     Parainfluenza 2 PCR 12/27/2020 Not Detected     Parainfluenza 3 PCR 12/27/2020 Not Detected     Parainfluenza 4 PCR 12/27/2020 Not Detected     Resp Syncytial Virus PCR 12/27/2020 Not Detected     Bordetella Parapertussis 12/27/2020 Not Detected     B Pertussis by PCR 12/27/2020 Not Detected     Chlamydia pneumoniae By * 12/27/2020 Not Detected     Mycoplasma pneumo by PCR 12/27/2020 Not Detected     Ventricular Rate 12/27/2020 77     Atrial Rate 12/27/2020 77     P-R Interval 12/27/2020 182     QRS Duration 12/27/2020 88     Q-T Interval 12/27/2020 374     QTc Calculation (Bazett) 12/27/2020 423 P Axis 12/27/2020 43     R Lipan 12/27/2020 16     T Lipan 12/27/2020 10     WBC 12/27/2020 7.8     RBC 12/27/2020 4.38     Hemoglobin 12/27/2020 13.6     Hematocrit 12/27/2020 40.8     MCV 12/27/2020 93.2     MCH 12/27/2020 31.1     MCHC 12/27/2020 33.3     RDW 12/27/2020 12.6     Platelets 43/67/4080 137 (A)     MPV 12/27/2020 9.9     NRBC Automated 12/27/2020 0.0     Glucose 12/27/2020 126 (A)     BUN 12/27/2020 15     Creatinine 12/27/2020 0.78     Bun/Cre Ratio 12/27/2020 19     Calcium 12/27/2020 8.9     Sodium 12/27/2020 135     Potassium 12/27/2020 4.0     Chloride 12/27/2020 102     CO2 12/27/2020 23     Anion Gap 12/27/2020 10     Alkaline Phosphatase 12/27/2020 44     ALT 12/27/2020 37     AST 12/27/2020 22     Total Bilirubin 12/27/2020 0.69     Total Protein 12/27/2020 6.7     Albumin 12/27/2020 3.6     Albumin/Globulin Ratio 12/27/2020 NOT REPORTED     GFR Non- 12/27/2020 >60     GFR  12/27/2020 >60     GFR Comment 12/27/2020          GFR Staging 12/27/2020 NOT REPORTED     Cholesterol 12/27/2020 109     HDL 12/27/2020 26 (A)     LDL Cholesterol 12/27/2020 53     Chol/HDL Ratio 12/27/2020 4.2     Triglycerides 12/27/2020 151 (A)     VLDL 12/27/2020 NOT REPORTED     Magnesium 12/27/2020 1.8     - 12/27/2020          WBC, UA 12/27/2020 0 TO 2     RBC, UA 12/27/2020 0 TO 2     Casts UA 12/27/2020 NOT REPORTED     Crystals, UA 12/27/2020 NOT REPORTED     Epithelial Cells UA 12/27/2020 0 TO 2     Renal Epithelial, UA 12/27/2020 NOT REPORTED     Bacteria, UA 12/27/2020 NOT REPORTED     Mucus, UA 12/27/2020 2+ (A)     Trichomonas, UA 12/27/2020 NOT REPORTED     Amorphous, UA 12/27/2020 NOT REPORTED     Other Observations UA 12/27/2020 NOT REPORTED     Yeast, UA 12/27/2020 NOT REPORTED     Left Ventricular Ejectio* 12/28/2020 60     LVEF MODALITY 12/28/2020 Nuclear     WBC 12/28/2020 6.3     RBC 12/28/2020 4.63     Hemoglobin 12/28/2020 14.1     Hematocrit 12/28/2020 42.5 MCV 12/28/2020 91.8     MCH 12/28/2020 30.5     MCHC 12/28/2020 33.2     RDW 12/28/2020 12.4     Platelets 65/37/8841 114 (A)     MPV 12/28/2020 9.9     NRBC Automated 12/28/2020 0.0     Glucose 12/28/2020 128 (A)     BUN 12/28/2020 13     Creatinine 12/28/2020 0.75     Bun/Cre Ratio 12/28/2020 17     Calcium 12/28/2020 9.0     Sodium 12/28/2020 138     Potassium 12/28/2020 4.0     Chloride 12/28/2020 104     CO2 12/28/2020 27     Anion Gap 12/28/2020 7 (A)     GFR Non- 12/28/2020 >60     GFR  12/28/2020 >60     GFR Comment 12/28/2020          GFR Staging 12/28/2020 NOT REPORTED        Assessment and Plan      1. Ankle injury, left, initial encounter  -     XR ANKLE LEFT (MIN 3 VIEWS); Future       ankle xrays left   Results per radiology  Placed in aircast  Recommend rest, ice, tylenol          No results found for this visit on 11/13/22. Return if symptoms worsen or fail to improve. No orders of the defined types were placed in this encounter. Patient given educational materials - see patient instructions. Discussed use, benefit, and side effects of prescribed medications. All patientquestions answered. Pt voiced understanding. Patient given educational materials - see patient instructions. Discussed use, benefit, and side effects of prescribed medications. All patientquestions answered. Pt voiced understanding. This note was transcribed using dictation with Dragon services. Efforts were made to correct any errors but some words may be misinterpreted.     Patient assumes risks associated with failure to complete recommended testing and treatments in a timely manner    Electronically signed by AURELIANO Morales CNP on 11/13/2022at 2:24 PM

## 2023-02-15 ENCOUNTER — OFFICE VISIT (OUTPATIENT)
Dept: FAMILY MEDICINE CLINIC | Age: 58
End: 2023-02-15
Payer: COMMERCIAL

## 2023-02-15 VITALS
BODY MASS INDEX: 34.95 KG/M2 | HEIGHT: 76 IN | HEART RATE: 76 BPM | WEIGHT: 287 LBS | SYSTOLIC BLOOD PRESSURE: 110 MMHG | TEMPERATURE: 97.3 F | OXYGEN SATURATION: 98 % | DIASTOLIC BLOOD PRESSURE: 70 MMHG

## 2023-02-15 DIAGNOSIS — R73.03 PREDIABETES: ICD-10-CM

## 2023-02-15 DIAGNOSIS — Z12.5 SCREENING FOR PROSTATE CANCER: ICD-10-CM

## 2023-02-15 DIAGNOSIS — I25.10 CORONARY ARTERY DISEASE INVOLVING NATIVE HEART WITHOUT ANGINA PECTORIS, UNSPECIFIED VESSEL OR LESION TYPE: Primary | ICD-10-CM

## 2023-02-15 PROCEDURE — 99213 OFFICE O/P EST LOW 20 MIN: CPT | Performed by: PHYSICIAN ASSISTANT

## 2023-02-15 RX ORDER — METOPROLOL SUCCINATE 100 MG/1
100 TABLET, EXTENDED RELEASE ORAL DAILY
Qty: 90 TABLET | Refills: 3 | Status: SHIPPED | OUTPATIENT
Start: 2023-02-15

## 2023-02-15 RX ORDER — NITROGLYCERIN 0.4 MG/1
0.4 TABLET SUBLINGUAL EVERY 5 MIN PRN
Qty: 25 TABLET | Refills: 0 | Status: SHIPPED | OUTPATIENT
Start: 2023-02-15

## 2023-02-15 RX ORDER — AMLODIPINE BESYLATE 2.5 MG/1
2.5 TABLET ORAL DAILY
Qty: 90 TABLET | Refills: 3 | Status: SHIPPED | OUTPATIENT
Start: 2023-02-15

## 2023-02-15 RX ORDER — PANTOPRAZOLE SODIUM 40 MG/1
40 TABLET, DELAYED RELEASE ORAL DAILY
Qty: 90 TABLET | Refills: 3 | Status: SHIPPED | OUTPATIENT
Start: 2023-02-15

## 2023-02-15 SDOH — ECONOMIC STABILITY: INCOME INSECURITY: HOW HARD IS IT FOR YOU TO PAY FOR THE VERY BASICS LIKE FOOD, HOUSING, MEDICAL CARE, AND HEATING?: PATIENT DECLINED

## 2023-02-15 SDOH — ECONOMIC STABILITY: FOOD INSECURITY: WITHIN THE PAST 12 MONTHS, YOU WORRIED THAT YOUR FOOD WOULD RUN OUT BEFORE YOU GOT MONEY TO BUY MORE.: PATIENT DECLINED

## 2023-02-15 SDOH — ECONOMIC STABILITY: HOUSING INSECURITY
IN THE LAST 12 MONTHS, WAS THERE A TIME WHEN YOU DID NOT HAVE A STEADY PLACE TO SLEEP OR SLEPT IN A SHELTER (INCLUDING NOW)?: PATIENT REFUSED

## 2023-02-15 SDOH — ECONOMIC STABILITY: FOOD INSECURITY: WITHIN THE PAST 12 MONTHS, THE FOOD YOU BOUGHT JUST DIDN'T LAST AND YOU DIDN'T HAVE MONEY TO GET MORE.: PATIENT DECLINED

## 2023-02-15 ASSESSMENT — ENCOUNTER SYMPTOMS
COLOR CHANGE: 0
CONSTIPATION: 0
VOMITING: 0
COUGH: 0
SHORTNESS OF BREATH: 0
ABDOMINAL PAIN: 0
NAUSEA: 0
WHEEZING: 0
DIARRHEA: 0

## 2023-02-15 ASSESSMENT — PATIENT HEALTH QUESTIONNAIRE - PHQ9
1. LITTLE INTEREST OR PLEASURE IN DOING THINGS: 0
SUM OF ALL RESPONSES TO PHQ QUESTIONS 1-9: 0
2. FEELING DOWN, DEPRESSED OR HOPELESS: 0
SUM OF ALL RESPONSES TO PHQ QUESTIONS 1-9: 0
SUM OF ALL RESPONSES TO PHQ QUESTIONS 1-9: 0
SUM OF ALL RESPONSES TO PHQ9 QUESTIONS 1 & 2: 0
SUM OF ALL RESPONSES TO PHQ QUESTIONS 1-9: 0

## 2023-02-15 NOTE — PROGRESS NOTES
Visit Information    Have you changed or started any medications since your last visit including any over-the-counter medicines, vitamins, or herbal medicines? no   Are you having any side effects from any of your medications? -  no  Have you stopped taking any of your medications? Is so, why? -  no    Have you seen any other physician or provider since your last visit? No  Have you had any other diagnostic tests since your last visit? No  Have you been seen in the emergency room and/or had an admission to a hospital since we last saw you? No  Have you had your routine dental cleaning in the past 6 months? no    Have you activated your [employer] account? If not, what are your barriers?  Yes     Patient Care Team:  Reinier Gasca PA-C as PCP - General (Family Medicine)  Reinier Gasca PA-C as PCP - Empaneled Provider    Medical History Review  Past Medical, Family, and Social History reviewed and  contribute to the patient presenting condition    Health Maintenance   Topic Date Due    COVID-19 Vaccine (1) Never done    HIV screen  Never done    Hepatitis C screen  Never done    DTaP/Tdap/Td vaccine (1 - Tdap) Never done    A1C test (Diabetic or Prediabetic)  12/27/2021    Lipids  12/27/2021    Flu vaccine (1) 08/01/2022    Depression Screen  08/15/2023    Colorectal Cancer Screen  06/12/2029    Shingles vaccine  Completed    Hepatitis A vaccine  Aged Out    Hib vaccine  Aged Out    Meningococcal (ACWY) vaccine  Aged Out    Pneumococcal 0-64 years Vaccine  Aged Out

## 2023-02-15 NOTE — PROGRESS NOTES
[address] [facility]  [address] [facility] [address] [address]  Dept: [phone]  Dept Fax: [phone]    Camilla Houser is a 56 y.o. male who presents today for his medical conditions/complaintsas noted below. Camilla Houser is c/o of   Chief Complaint   Patient presents with    Coronary Artery Disease         HPI:     LIANE    Destinee Mix is here today for recheck on current medications. He is needing refills today. He continues to follow with his cardiology office regularly. He states he has not used nitroglycerin in almost 2 years. He continues to stay active. He and his wife have just built a new home which is keeping them very busy. He admits to eating out and getting takeout little more often over the last few months due to the build. He plans to start cooking at home regularly in the very near future.   Overall he reports feeling very well and has no concerns today    Hemoglobin A1C (%)   Date Value   12/27/2020 5.5   11/11/2020 5.6   01/23/2019 5.3             ( goal A1Cis < 7)   No results found for: LABMICR  LDL Cholesterol (mg/dL)   Date Value   12/27/2020 53     LDL Calculated (mg/dL)   Date Value   11/11/2020 51   01/23/2019 36   11/12/2018 35       (goal LDL is <100)   AST (U/L)   Date Value   12/27/2020 22     ALT (U/L)   Date Value   12/27/2020 37     BUN (mg/dL)   Date Value   12/28/2020 13     BP Readings from Last 3 Encounters:   02/15/23 110/70   11/13/22 109/72   08/15/22 101/72          (goal 120/80)    Past Medical History:   Diagnosis Date    Allergic rhinitis     Coronary artery disease involving native heart without angina pectoris 11/6/2018    GERD (gastroesophageal reflux disease)     Heart attack ([facility] [address] 03/12/2018    Patient had 3 stents placed    NSTEMI (non-ST elevated myocardial infarction) ([facility] [address] 3/23/2018      Past Surgical History:   Procedure Laterality Date    CORONARY ANGIOPLASTY WITH STENT PLACEMENT  03/12/2018    3 stents xience alpine 4.0 x 18 mm LAD    KNEE SURGERY Left     at 28 yo       Family History   Problem Relation Age of Onset    Diabetes Mother     Diabetes Father          3/2020 sepsis    Liver Disease Father     Heart Disease Brother         also smoker    Heart Surgery Brother     Heart Attack Brother         x's 2    Heart Attack Paternal Uncle         5 of the brothers with heart disease    Heart Disease Paternal Uncle        Social History     Tobacco Use    Smoking status: Never    Smokeless tobacco: Never   Substance Use Topics    Alcohol use: No      Current Outpatient Medications   Medication Sig Dispense Refill    pantoprazole (PROTONIX) 40 MG tablet Take 1 tablet by mouth daily 90 tablet 3    nitroGLYCERIN (NITROSTAT) 0.4 MG SL tablet Place 1 tablet under the tongue every 5 minutes as needed for Chest pain up to max of 3 total doses. If no relief after 1 dose, call 911. 25 tablet 0    metoprolol succinate (TOPROL XL) 100 MG extended release tablet Take 1 tablet by mouth daily 90 tablet 3    amLODIPine (NORVASC) 2.5 MG tablet Take 1 tablet by mouth daily 90 tablet 3    loratadine (CLARITIN) 10 MG capsule Take 1 capsule by mouth daily 90 capsule 3    SM ASPIRIN ADULT LOW STRENGTH 81 MG EC tablet TAKE 1 TABLET BY MOUTH ONE TIME A DAY  90 tablet 3    atorvastatin (LIPITOR) 80 MG tablet Take 1 tablet by mouth daily (Patient not taking: No sig reported) 90 tablet 3    clopidogrel (PLAVIX) 75 MG tablet Take 1 tablet by mouth daily (Patient not taking: No sig reported) 90 tablet 0     No current facility-administered medications for this visit.      No Known Allergies    Health Maintenance   Topic Date Due    COVID-19 Vaccine (1) Never done    HIV screen  Never done    Hepatitis C screen  Never done    DTaP/Tdap/Td vaccine (1 - Tdap) Never done    A1C test (Diabetic or Prediabetic)  2021    Flu vaccine (1) 2022    Lipids  2023    Depression Screen  08/15/2023    Colorectal Cancer Screen  2029 Shingles vaccine  Completed    Hepatitis A vaccine  Aged Out    Hib vaccine  Aged Out    Meningococcal (ACWY) vaccine  Aged Out    Pneumococcal 0-64 years Vaccine  Aged Out       Subjective:     Review of Systems   Constitutional:  Negative for activity change, appetite change, fatigue, fever and unexpected weight change. /70 (Site: Left Upper Arm, Position: Sitting, Cuff Size: Large Adult)   Pulse 76   Temp 97.3 °F (36.3 °C) (Tympanic)   Ht 6' 4\" (1.93 m)   Wt 287 lb (130.2 kg)   SpO2 98%   BMI 34.93 kg/m²    Respiratory:  Negative for cough, shortness of breath and wheezing. Cardiovascular:  Negative for leg swelling. Gastrointestinal:  Negative for abdominal pain, constipation, diarrhea, nausea and vomiting. Skin:  Negative for color change, pallor, rash and wound. Neurological:  Negative for weakness. Hematological:  Negative for adenopathy. Psychiatric/Behavioral:  The patient is not nervous/anxious. Objective:     Physical Exam  Vitals and nursing note reviewed. Constitutional:       General: He is not in acute distress. Appearance: Normal appearance. He is well-developed. He is not ill-appearing. HENT:      Head: Normocephalic and atraumatic. Cardiovascular:      Rate and Rhythm: Normal rate and regular rhythm. Heart sounds: No murmur heard. Pulmonary:      Effort: Pulmonary effort is normal. No respiratory distress. Breath sounds: Normal breath sounds. No wheezing, rhonchi or rales. Musculoskeletal:      Right lower leg: No edema. Left lower leg: No edema. Skin:     General: Skin is warm and dry. Coloration: Skin is not pale. Findings: No erythema or rash. Neurological:      Mental Status: He is alert and oriented to person, place, and time. Psychiatric:         Mood and Affect: Mood normal.         Behavior: Behavior normal.         Thought Content:  Thought content normal.         Judgment: Judgment normal.     /70 (Site: Left Upper Arm, Position: Sitting, Cuff Size: Large Adult)   Pulse 76   Temp 97.3 °F (36.3 °C) (Tympanic)   Ht 6' 4\" (1.93 m)   Wt 287 lb (130.2 kg)   SpO2 98%   BMI 34.93 kg/m²     Assessment:       Diagnosis Orders   1. Coronary artery disease involving native heart without angina pectoris, unspecified vessel or lesion type  nitroGLYCERIN (NITROSTAT) 0.4 MG SL tablet    metoprolol succinate (TOPROL XL) 100 MG extended release tablet    amLODIPine (NORVASC) 2.5 MG tablet    Comprehensive Metabolic Panel    Lipid Panel    CBC with Auto Differential      2. Screening for prostate cancer  PSA Screening      3. Prediabetes  Hemoglobin A1C                Plan:      Return in about 1 year (around 2/15/2024) for med review. Patient is doing well today, blood pressure is in great range. Refills were sent to the pharmacy on all needed medications. Nitroglycerin for as needed use. Continue with cardiology as recommended. We will update annual labs including CBC, CMP, lipid panel, PSA and screening A1c. Await results  I encouraged him to aim for a healthy well-balanced diet and regular exercise.   We will plan to see him again in 1 year, sooner if any questions or concerns arise  Patient agreed with treatment plan    Orders Placed This Encounter   Procedures    Comprehensive Metabolic Panel     Standing Status:   Future     Standing Expiration Date:   2/15/2024    Lipid Panel     Standing Status:   Future     Standing Expiration Date:   2/15/2024     Order Specific Question:   Is Patient Fasting?/# of Hours     Answer:   yes    CBC with Auto Differential     Standing Status:   Future     Standing Expiration Date:   2/15/2024    Hemoglobin A1C     Standing Status:   Future     Standing Expiration Date:   2/15/2024    PSA Screening     Standing Status:   Future     Standing Expiration Date:   2/15/2024     Orders Placed This Encounter   Medications    pantoprazole (PROTONIX) 40 MG tablet     Sig: Take 1 tablet by mouth daily     Dispense:  90 tablet     Refill:  3    nitroGLYCERIN (NITROSTAT) 0.4 MG SL tablet     Sig: Place 1 tablet under the tongue every 5 minutes as needed for Chest pain up to max of 3 total doses. If no relief after 1 dose, call 911. Dispense:  25 tablet     Refill:  0    metoprolol succinate (TOPROL XL) 100 MG extended release tablet     Sig: Take 1 tablet by mouth daily     Dispense:  90 tablet     Refill:  3    amLODIPine (NORVASC) 2.5 MG tablet     Sig: Take 1 tablet by mouth daily     Dispense:  90 tablet     Refill:  3       Patient given educational materials - see patient instructions. Discussed use, benefit, and side effects of prescribed medications. All patientquestions answered. Pt voiced understanding. Reviewed health maintenance. Instructedto continue current medications, diet and exercise. Patient agreed with treatmentplan. Follow up as directed. Please note that this chart was generated using voice recognition Dragon dictation software. Although every effort was made to ensure the accuracy of this automated transcription, some errors in transcription may have occurred.      Electronically signed by Lorin Oneal PA-C on 2/15/2023 at 9:32 AM

## 2024-05-14 NOTE — CARE COORDINATION
Case Management Initial Discharge Plan  Ricky Elizaldes,         Readmission Risk              Risk of Unplanned Readmission:        0             Met with:patient to discuss discharge plans. Information verified: address, contacts, phone number, , insurance Yes-updated wife's phone number  PCP: Estrellita Rangel PA-C  Date of last visit: 2020     Insurance Provider: Isabel Mustafa    Discharge Planning  Current Residence:  One story  Living Arrangements:  Spouse/Significant Other   Home has 0 stories/3 stairs to climb into home  Support Systems:  Spouse/Significant Other, Children  Current Services PTA:  na Agency: na  Patient able to perform ADL's:Independent  DME in home:  Na   DME used to aid ambulation prior to admission:   na  DME used during admission:  na    Potential Assistance Needed:  N/A    Pharmacy: [facility]   Potential Assistance Purchasing Medications:  No  Does patient want to participate in local refill/ meds to beds program?  No    Patient agreeable to home care: No  [place] of choice provided:  n/a      Type of Home Care Services:  None  Patient expects to be discharged to:  Home    Prior SNF/Rehab Placement and Facility: no  Agreeable to SNF/Rehab: No  [place] of choice provided: n/a   Evaluation: n/a    Expected Discharge date:  20  Follow Up Appointment: Best Day/ Time: Friday AM    Transportation provider: wife  Transportation arrangements needed for discharge: No    Discharge Plan: Pt. Is independent, works, drives. No DME. Declines any dc needs. Sees Dr. Dallas Angulo cardiologist at [facility] last appointment . Plan for stress in am if any intervention is required he is agreeable to have done here.        Electronically signed by Tanner Coombs RN on 20 at 5:59 PM EST
Detail Level: Detailed
Quality 226: Preventive Care And Screening: Tobacco Use: Screening And Cessation Intervention: Patient screened for tobacco use and is an ex/non-smoker